# Patient Record
Sex: MALE | Race: WHITE | Employment: OTHER | URBAN - NONMETROPOLITAN AREA
[De-identification: names, ages, dates, MRNs, and addresses within clinical notes are randomized per-mention and may not be internally consistent; named-entity substitution may affect disease eponyms.]

---

## 2017-01-24 RX ORDER — METOPROLOL SUCCINATE 25 MG/1
TABLET, EXTENDED RELEASE ORAL
Qty: 30 TABLET | Refills: 2 | Status: SHIPPED | OUTPATIENT
Start: 2017-01-24 | End: 2017-03-06 | Stop reason: SDUPTHER

## 2017-01-24 RX ORDER — LISINOPRIL 5 MG/1
TABLET ORAL
Qty: 30 TABLET | Refills: 2 | Status: SHIPPED | OUTPATIENT
Start: 2017-01-24 | End: 2017-03-06 | Stop reason: SDUPTHER

## 2017-01-24 RX ORDER — ATORVASTATIN CALCIUM 40 MG/1
TABLET, FILM COATED ORAL
Qty: 30 TABLET | Refills: 2 | Status: SHIPPED | OUTPATIENT
Start: 2017-01-24 | End: 2017-03-06 | Stop reason: SDUPTHER

## 2017-03-06 DIAGNOSIS — I25.10 CORONARY ARTERY DISEASE INVOLVING NATIVE HEART WITHOUT ANGINA PECTORIS, UNSPECIFIED VESSEL OR LESION TYPE: ICD-10-CM

## 2017-03-06 RX ORDER — CLOPIDOGREL BISULFATE 75 MG/1
TABLET ORAL
Qty: 30 TABLET | Refills: 3 | Status: SHIPPED | OUTPATIENT
Start: 2017-03-06 | End: 2017-06-27 | Stop reason: ALTCHOICE

## 2017-03-06 RX ORDER — LISINOPRIL 5 MG/1
TABLET ORAL
Qty: 30 TABLET | Refills: 2 | Status: SHIPPED | OUTPATIENT
Start: 2017-03-06 | End: 2017-08-01 | Stop reason: SDUPTHER

## 2017-03-06 RX ORDER — ATORVASTATIN CALCIUM 40 MG/1
TABLET, FILM COATED ORAL
Qty: 30 TABLET | Refills: 2 | Status: SHIPPED | OUTPATIENT
Start: 2017-03-06 | End: 2017-08-01 | Stop reason: SDUPTHER

## 2017-03-06 RX ORDER — METOPROLOL SUCCINATE 25 MG/1
TABLET, EXTENDED RELEASE ORAL
Qty: 30 TABLET | Refills: 2 | Status: SHIPPED | OUTPATIENT
Start: 2017-03-06 | End: 2017-08-01 | Stop reason: SDUPTHER

## 2017-06-27 ENCOUNTER — OFFICE VISIT (OUTPATIENT)
Dept: CARDIOLOGY | Age: 72
End: 2017-06-27
Payer: MEDICARE

## 2017-06-27 VITALS
HEIGHT: 69 IN | BODY MASS INDEX: 25.77 KG/M2 | DIASTOLIC BLOOD PRESSURE: 66 MMHG | HEART RATE: 60 BPM | SYSTOLIC BLOOD PRESSURE: 124 MMHG | WEIGHT: 174 LBS

## 2017-06-27 DIAGNOSIS — I10 ESSENTIAL HYPERTENSION: ICD-10-CM

## 2017-06-27 DIAGNOSIS — I25.10 CORONARY ARTERY DISEASE INVOLVING NATIVE HEART WITHOUT ANGINA PECTORIS, UNSPECIFIED VESSEL OR LESION TYPE: Primary | ICD-10-CM

## 2017-06-27 DIAGNOSIS — I51.9 LEFT VENTRICULAR DYSFUNCTION: ICD-10-CM

## 2017-06-27 PROCEDURE — 1123F ACP DISCUSS/DSCN MKR DOCD: CPT | Performed by: INTERNAL MEDICINE

## 2017-06-27 PROCEDURE — G8419 CALC BMI OUT NRM PARAM NOF/U: HCPCS | Performed by: INTERNAL MEDICINE

## 2017-06-27 PROCEDURE — 1036F TOBACCO NON-USER: CPT | Performed by: INTERNAL MEDICINE

## 2017-06-27 PROCEDURE — G8427 DOCREV CUR MEDS BY ELIG CLIN: HCPCS | Performed by: INTERNAL MEDICINE

## 2017-06-27 PROCEDURE — 3017F COLORECTAL CA SCREEN DOC REV: CPT | Performed by: INTERNAL MEDICINE

## 2017-06-27 PROCEDURE — G8598 ASA/ANTIPLAT THER USED: HCPCS | Performed by: INTERNAL MEDICINE

## 2017-06-27 PROCEDURE — 4040F PNEUMOC VAC/ADMIN/RCVD: CPT | Performed by: INTERNAL MEDICINE

## 2017-06-27 PROCEDURE — 99212 OFFICE O/P EST SF 10 MIN: CPT | Performed by: INTERNAL MEDICINE

## 2017-06-27 RX ORDER — MINOCYCLINE HYDROCHLORIDE 50 MG/1
50 CAPSULE ORAL 2 TIMES DAILY
COMMUNITY
Start: 2017-06-05 | End: 2019-07-02

## 2017-06-27 RX ORDER — PANTOPRAZOLE SODIUM 40 MG/1
40 TABLET, DELAYED RELEASE ORAL DAILY
COMMUNITY
Start: 2017-06-05 | End: 2018-01-03

## 2017-08-01 RX ORDER — ATORVASTATIN CALCIUM 40 MG/1
TABLET, FILM COATED ORAL
Qty: 30 TABLET | Refills: 0 | Status: SHIPPED | OUTPATIENT
Start: 2017-08-01 | End: 2018-01-03 | Stop reason: SDUPTHER

## 2017-08-01 RX ORDER — METOPROLOL SUCCINATE 25 MG/1
TABLET, EXTENDED RELEASE ORAL
Qty: 30 TABLET | Refills: 5 | Status: SHIPPED | OUTPATIENT
Start: 2017-08-01 | End: 2018-01-03 | Stop reason: SDUPTHER

## 2017-08-01 RX ORDER — LISINOPRIL 5 MG/1
TABLET ORAL
Qty: 30 TABLET | Refills: 5 | Status: SHIPPED | OUTPATIENT
Start: 2017-08-01 | End: 2018-01-03 | Stop reason: DRUGHIGH

## 2017-12-07 ENCOUNTER — TELEPHONE (OUTPATIENT)
Dept: CARDIOLOGY | Age: 72
End: 2017-12-07

## 2017-12-07 NOTE — TELEPHONE ENCOUNTER
Patient is calling stating his kidney doctor is wanting to adjust his Lisinopril but is wanting Dr. Reece Skiff to OK this before doing so.

## 2018-01-03 ENCOUNTER — OFFICE VISIT (OUTPATIENT)
Dept: CARDIOLOGY | Age: 73
End: 2018-01-03
Payer: MEDICARE

## 2018-01-03 VITALS
DIASTOLIC BLOOD PRESSURE: 78 MMHG | HEART RATE: 55 BPM | SYSTOLIC BLOOD PRESSURE: 112 MMHG | BODY MASS INDEX: 28.88 KG/M2 | HEIGHT: 69 IN | WEIGHT: 195 LBS

## 2018-01-03 DIAGNOSIS — I25.10 CORONARY ARTERY DISEASE INVOLVING NATIVE CORONARY ARTERY OF NATIVE HEART WITHOUT ANGINA PECTORIS: Primary | ICD-10-CM

## 2018-01-03 DIAGNOSIS — E78.5 HYPERLIPIDEMIA, UNSPECIFIED HYPERLIPIDEMIA TYPE: ICD-10-CM

## 2018-01-03 PROCEDURE — 3017F COLORECTAL CA SCREEN DOC REV: CPT | Performed by: CLINICAL NURSE SPECIALIST

## 2018-01-03 PROCEDURE — 99213 OFFICE O/P EST LOW 20 MIN: CPT | Performed by: CLINICAL NURSE SPECIALIST

## 2018-01-03 PROCEDURE — 1123F ACP DISCUSS/DSCN MKR DOCD: CPT | Performed by: CLINICAL NURSE SPECIALIST

## 2018-01-03 PROCEDURE — 1036F TOBACCO NON-USER: CPT | Performed by: CLINICAL NURSE SPECIALIST

## 2018-01-03 PROCEDURE — G8598 ASA/ANTIPLAT THER USED: HCPCS | Performed by: CLINICAL NURSE SPECIALIST

## 2018-01-03 PROCEDURE — G8419 CALC BMI OUT NRM PARAM NOF/U: HCPCS | Performed by: CLINICAL NURSE SPECIALIST

## 2018-01-03 PROCEDURE — G8427 DOCREV CUR MEDS BY ELIG CLIN: HCPCS | Performed by: CLINICAL NURSE SPECIALIST

## 2018-01-03 PROCEDURE — 4040F PNEUMOC VAC/ADMIN/RCVD: CPT | Performed by: CLINICAL NURSE SPECIALIST

## 2018-01-03 PROCEDURE — G8484 FLU IMMUNIZE NO ADMIN: HCPCS | Performed by: CLINICAL NURSE SPECIALIST

## 2018-01-03 PROCEDURE — 93000 ELECTROCARDIOGRAM COMPLETE: CPT | Performed by: CLINICAL NURSE SPECIALIST

## 2018-01-03 RX ORDER — ATORVASTATIN CALCIUM 40 MG/1
40 TABLET, FILM COATED ORAL DAILY
Qty: 90 TABLET | Refills: 3 | Status: SHIPPED | OUTPATIENT
Start: 2018-01-03 | End: 2018-04-06 | Stop reason: DRUGHIGH

## 2018-01-03 RX ORDER — ALFUZOSIN HYDROCHLORIDE 10 MG/1
10 TABLET, EXTENDED RELEASE ORAL DAILY
COMMUNITY

## 2018-01-03 RX ORDER — RANITIDINE 150 MG/1
150 TABLET ORAL 2 TIMES DAILY
COMMUNITY
End: 2021-06-29

## 2018-01-03 RX ORDER — METOPROLOL SUCCINATE 25 MG/1
25 TABLET, EXTENDED RELEASE ORAL DAILY
Qty: 90 TABLET | Refills: 3 | Status: SHIPPED | OUTPATIENT
Start: 2018-01-03 | End: 2018-07-12 | Stop reason: SDUPTHER

## 2018-01-03 RX ORDER — LISINOPRIL 2.5 MG/1
2.5 TABLET ORAL DAILY
COMMUNITY
End: 2018-07-12 | Stop reason: SDUPTHER

## 2018-01-03 NOTE — PROGRESS NOTES
[facility], [address] [place] [address], [address] [id number]  Phone: [phone]  Fax: [phone]    OFFICE VISIT:  1/3/2018    Codey Aguilar - : 1945    Reason For Visit:  Dorothy Tan is a 67 y.o. male who is here for 6 Month Follow-Up (no symptoms ) and Coronary Artery Disease   16  Acute inferior ST segment elevation MI, received TNKase at [facility], AUC indication 2, AUC score 9, [facility] 2012;59:16  Cath 90% mid LAD 2.25 x 15 and 2.25 x 18 KAVITA, o/w luminals, apical dyskinesis, EF 45%    Subjective  Dorothy Tan denies exertional chest pain, shortness of breath, orthopnea, paroxysmal nocturnal dyspnea, syncope, presyncope, arrhythmia, edema and fatigue. The patient denies numbness or weakness to suggest cerebrovascular accident or transient ischemic attack. CARLOS MANUEL Mayberry is PCP and follows labs including lipids.   He is following with Dr. Chikis Christine  in [place] for stage 3 CKD  Switched from PPI to H2 blocker and stopped his IBU  Dr Huy Colon is urologist.   Codey Aguilar has the following history as recorded in [facility]:    Patient Active Problem List    Diagnosis Date Noted    Hyperlipidemia     Chest pain     MI (myocardial infarction)     Left ventricular dysfunction     CAD (coronary artery disease) 2016    ED (erectile dysfunction) 2016     Past Medical History:   Diagnosis Date    Brewer esophagus     Dr Mino Plascencia CAD (coronary artery disease) 2016  Acute inferior ST segment elevation MI, received TNKase at [facility], AUC indication 2, AUC score 9, [facility] ;59:16  Cath 90% mid LAD 2.25 x 15 and 2.25 x 18 KAVITA, o/w luminals, apical dyskinesis, EF 45%    Cancer (HCC)     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    ED (erectile dysfunction) 2016    Kidney disease stage 3     Left ventricular dysfunction     MI (myocardial infarction)      Past Surgical History:   Procedure Laterality Date    CARDIAC CATHETERIZATION      COLONOSCOPY      HAND SURGERY      SKIN BIOPSY       Family History   Problem Relation Age of Onset    Heart Disease Mother      Social History   Substance Use Topics    Smoking status: Former Smoker    Smokeless tobacco: Never Used    Alcohol use Not on file      Current Outpatient Prescriptions   Medication Sig Dispense Refill    lisinopril (PRINIVIL;ZESTRIL) 2.5 MG tablet Take 2.5 mg by mouth daily      ranitidine (ZANTAC) 150 MG tablet Take 150 mg by mouth 2 times daily      alfuzosin (UROXATRAL) 10 MG extended release tablet Take 10 mg by mouth daily      atorvastatin (LIPITOR) 40 MG tablet Take 1 tablet by mouth daily 90 tablet 3    metoprolol succinate (TOPROL XL) 25 MG extended release tablet Take 1 tablet by mouth daily 90 tablet 3    minocycline (MINOCIN;DYNACIN) 50 MG capsule Take 50 mg by mouth 2 times daily       aspirin 81 MG tablet Take 81 mg by mouth daily      tadalafil (CIALIS) 5 MG tablet Take 5 mg by mouth as needed for Erectile Dysfunction       No current facility-administered medications for this visit. Allergies: Review of patient's allergies indicates no known allergies. Review of Systems  Constitutional  no significant activity change, appetite change, or unexpected weight change. No fever, chills or diaphoresis. No fatigue. HEENT  no significant rhinorrhea or epistaxis. No tinnitus or significant hearing loss. Eyes  no sudden vision change or amaurosis. Respiratory  no significant wheezing, stridor, apnea or cough. No dyspnea on exertion or shortness of breath. Cardiovascular  no exertional chest pain, orthopnea or PND. No sensation of arrhythmia or slow heart rate. No claudication or leg edema. Gastrointestinal  no abdominal swelling or pain. No blood in stool. No severe constipation, diarrhea, nausea, or vomiting. Genitourinary  no difficulty urinating, dysuria, frequency, or urgency.  No flank pain or 06/27/17 60   12/20/16 60      EKG today shows sinus bradycardia with a rate of 55. First-degree AV block and right BBB. Extensive old anterior inferior infarct. This is unchanged from previous EKG  Blood pressure heart rate well-controlled. On appropriate medical management with beta blocker, ACE inhibitor, aspirin and statin  Reviewed cholesterol from PCP done in November. Total cholesterol 159, triglycerides 114, HDL 52, LDL 84 with normal liver enzymes. Also reviewed labs creatinine holding steady at 1.54. He has follow-up with labs next month for his nephrologist after medication changes     States taking medications as prescribed  Stable cardiovascular status. No evidence of overt heart failure, angina or dysrhythmia. Plan    Follow up in 6 months With Dr. Prince Foss  Call with any questions or concerns  Follow up with CARLOS MANUEL Reinoso for non cardiac problems and labs   Report any new problems  Cardiovascular Fitness-Exercise as tolerated. Strive for 30 minutes of exercise most days of the week. Cardiac / Healthy Diet  Continue current medications as directed  Continue plan of treatment  It is always recommended that you bring your medications bottles with you to each visit - this is for your safety!        Carlen Peabody, APRN

## 2018-02-15 ENCOUNTER — OFFICE VISIT (OUTPATIENT)
Dept: CARDIOLOGY | Age: 73
End: 2018-02-15
Payer: MEDICARE

## 2018-02-15 VITALS
HEIGHT: 70 IN | SYSTOLIC BLOOD PRESSURE: 114 MMHG | BODY MASS INDEX: 28.2 KG/M2 | HEART RATE: 70 BPM | WEIGHT: 197 LBS | DIASTOLIC BLOOD PRESSURE: 74 MMHG

## 2018-02-15 DIAGNOSIS — R07.9 CHEST PAIN, UNSPECIFIED TYPE: ICD-10-CM

## 2018-02-15 DIAGNOSIS — I25.10 CORONARY ARTERY DISEASE INVOLVING NATIVE CORONARY ARTERY OF NATIVE HEART WITHOUT ANGINA PECTORIS: Primary | ICD-10-CM

## 2018-02-15 PROCEDURE — 3017F COLORECTAL CA SCREEN DOC REV: CPT | Performed by: CLINICAL NURSE SPECIALIST

## 2018-02-15 PROCEDURE — 99213 OFFICE O/P EST LOW 20 MIN: CPT | Performed by: CLINICAL NURSE SPECIALIST

## 2018-02-15 PROCEDURE — G8419 CALC BMI OUT NRM PARAM NOF/U: HCPCS | Performed by: CLINICAL NURSE SPECIALIST

## 2018-02-15 PROCEDURE — 1123F ACP DISCUSS/DSCN MKR DOCD: CPT | Performed by: CLINICAL NURSE SPECIALIST

## 2018-02-15 PROCEDURE — 4040F PNEUMOC VAC/ADMIN/RCVD: CPT | Performed by: CLINICAL NURSE SPECIALIST

## 2018-02-15 PROCEDURE — G8427 DOCREV CUR MEDS BY ELIG CLIN: HCPCS | Performed by: CLINICAL NURSE SPECIALIST

## 2018-02-15 PROCEDURE — G8598 ASA/ANTIPLAT THER USED: HCPCS | Performed by: CLINICAL NURSE SPECIALIST

## 2018-02-15 PROCEDURE — 1036F TOBACCO NON-USER: CPT | Performed by: CLINICAL NURSE SPECIALIST

## 2018-02-15 PROCEDURE — G8484 FLU IMMUNIZE NO ADMIN: HCPCS | Performed by: CLINICAL NURSE SPECIALIST

## 2018-02-15 RX ORDER — SILDENAFIL CITRATE 20 MG/1
20 TABLET ORAL PRN
COMMUNITY
End: 2022-05-24 | Stop reason: ALTCHOICE

## 2018-02-22 ENCOUNTER — APPOINTMENT (OUTPATIENT)
Dept: GENERAL RADIOLOGY | Age: 73
End: 2018-02-22
Attending: INTERNAL MEDICINE
Payer: MEDICARE

## 2018-02-22 ENCOUNTER — HOSPITAL ENCOUNTER (OUTPATIENT)
Dept: NON INVASIVE DIAGNOSTICS | Age: 73
Setting detail: OBSERVATION
Discharge: HOME | End: 2018-02-23
Attending: INTERNAL MEDICINE | Admitting: INTERNAL MEDICINE
Payer: MEDICARE

## 2018-02-22 DIAGNOSIS — R07.9 CHEST PAIN, UNSPECIFIED TYPE: ICD-10-CM

## 2018-02-22 DIAGNOSIS — I25.10 CORONARY ARTERY DISEASE INVOLVING NATIVE CORONARY ARTERY OF NATIVE HEART WITHOUT ANGINA PECTORIS: ICD-10-CM

## 2018-02-22 LAB
CHOLESTEROL, TOTAL: 131 MG/DL (ref 160–199)
HDLC SERPL-MCNC: 47 MG/DL (ref 55–121)
LDL CHOLESTEROL CALCULATED: 59 MG/DL
LV EF: 45 %
LVEF MODALITY: NORMAL
TRIGL SERPL-MCNC: 126 MG/DL (ref 0–149)

## 2018-02-22 PROCEDURE — 6370000000 HC RX 637 (ALT 250 FOR IP): Performed by: INTERNAL MEDICINE

## 2018-02-22 PROCEDURE — 2580000003 HC RX 258: Performed by: INTERNAL MEDICINE

## 2018-02-22 PROCEDURE — 80061 LIPID PANEL: CPT

## 2018-02-22 PROCEDURE — 6360000004 HC RX CONTRAST MEDICATION: Performed by: INTERNAL MEDICINE

## 2018-02-22 PROCEDURE — C1760 CLOSURE DEV, VASC: HCPCS

## 2018-02-22 PROCEDURE — 92928 PRQ TCAT PLMT NTRAC ST 1 LES: CPT | Performed by: INTERNAL MEDICINE

## 2018-02-22 PROCEDURE — 6370000000 HC RX 637 (ALT 250 FOR IP)

## 2018-02-22 PROCEDURE — C1887 CATHETER, GUIDING: HCPCS

## 2018-02-22 PROCEDURE — 93458 L HRT ARTERY/VENTRICLE ANGIO: CPT | Performed by: INTERNAL MEDICINE

## 2018-02-22 PROCEDURE — C1874 STENT, COATED/COV W/DEL SYS: HCPCS

## 2018-02-22 PROCEDURE — G0378 HOSPITAL OBSERVATION PER HR: HCPCS

## 2018-02-22 PROCEDURE — 2720000001 HC MISC SURG SUPPLY STERILE $51-500

## 2018-02-22 PROCEDURE — C1769 GUIDE WIRE: HCPCS

## 2018-02-22 PROCEDURE — 2720000000 HC MISC SURG SUPPLY STERILE $0-50

## 2018-02-22 PROCEDURE — 6360000002 HC RX W HCPCS

## 2018-02-22 PROCEDURE — 2709999900 HC NON-CHARGEABLE SUPPLY

## 2018-02-22 PROCEDURE — 36415 COLL VENOUS BLD VENIPUNCTURE: CPT

## 2018-02-22 RX ORDER — SODIUM CHLORIDE 9 MG/ML
INJECTION, SOLUTION INTRAVENOUS CONTINUOUS
Status: DISCONTINUED | OUTPATIENT
Start: 2018-02-22 | End: 2018-02-22

## 2018-02-22 RX ORDER — SILDENAFIL CITRATE 20 MG/1
20 TABLET ORAL 3 TIMES DAILY
Status: DISCONTINUED | OUTPATIENT
Start: 2018-02-22 | End: 2018-02-23 | Stop reason: HOSPADM

## 2018-02-22 RX ORDER — METOPROLOL SUCCINATE 25 MG/1
25 TABLET, EXTENDED RELEASE ORAL DAILY
Status: DISCONTINUED | OUTPATIENT
Start: 2018-02-22 | End: 2018-02-23 | Stop reason: HOSPADM

## 2018-02-22 RX ORDER — SODIUM CHLORIDE 0.9 % (FLUSH) 0.9 %
10 SYRINGE (ML) INJECTION EVERY 12 HOURS SCHEDULED
Status: DISCONTINUED | OUTPATIENT
Start: 2018-02-22 | End: 2018-02-23 | Stop reason: HOSPADM

## 2018-02-22 RX ORDER — FAMOTIDINE 20 MG/1
20 TABLET, FILM COATED ORAL 2 TIMES DAILY
Status: DISCONTINUED | OUTPATIENT
Start: 2018-02-22 | End: 2018-02-23 | Stop reason: HOSPADM

## 2018-02-22 RX ORDER — LISINOPRIL 2.5 MG/1
2.5 TABLET ORAL DAILY
Status: DISCONTINUED | OUTPATIENT
Start: 2018-02-22 | End: 2018-02-23 | Stop reason: HOSPADM

## 2018-02-22 RX ORDER — ONDANSETRON 2 MG/ML
4 INJECTION INTRAMUSCULAR; INTRAVENOUS EVERY 6 HOURS PRN
Status: DISCONTINUED | OUTPATIENT
Start: 2018-02-22 | End: 2018-02-23 | Stop reason: HOSPADM

## 2018-02-22 RX ORDER — ASPIRIN 81 MG/1
81 TABLET, CHEWABLE ORAL DAILY
Status: DISCONTINUED | OUTPATIENT
Start: 2018-02-22 | End: 2018-02-23 | Stop reason: HOSPADM

## 2018-02-22 RX ORDER — SODIUM CHLORIDE 0.9 % (FLUSH) 0.9 %
10 SYRINGE (ML) INJECTION EVERY 12 HOURS SCHEDULED
Status: DISCONTINUED | OUTPATIENT
Start: 2018-02-22 | End: 2018-02-22

## 2018-02-22 RX ORDER — SODIUM CHLORIDE 9 MG/ML
INJECTION, SOLUTION INTRAVENOUS CONTINUOUS
Status: ACTIVE | OUTPATIENT
Start: 2018-02-22 | End: 2018-02-22

## 2018-02-22 RX ORDER — SODIUM CHLORIDE 0.9 % (FLUSH) 0.9 %
10 SYRINGE (ML) INJECTION PRN
Status: DISCONTINUED | OUTPATIENT
Start: 2018-02-22 | End: 2018-02-22

## 2018-02-22 RX ORDER — RANITIDINE 150 MG/1
150 TABLET ORAL 2 TIMES DAILY
Status: DISCONTINUED | OUTPATIENT
Start: 2018-02-22 | End: 2018-02-22 | Stop reason: CLARIF

## 2018-02-22 RX ORDER — MINOCYCLINE HYDROCHLORIDE 50 MG/1
50 CAPSULE ORAL 2 TIMES DAILY
Status: DISCONTINUED | OUTPATIENT
Start: 2018-02-22 | End: 2018-02-23 | Stop reason: HOSPADM

## 2018-02-22 RX ORDER — PRASUGREL 10 MG/1
10 TABLET, FILM COATED ORAL DAILY
Status: DISCONTINUED | OUTPATIENT
Start: 2018-02-23 | End: 2018-02-23 | Stop reason: HOSPADM

## 2018-02-22 RX ORDER — SODIUM CHLORIDE 0.9 % (FLUSH) 0.9 %
10 SYRINGE (ML) INJECTION PRN
Status: DISCONTINUED | OUTPATIENT
Start: 2018-02-22 | End: 2018-02-23 | Stop reason: HOSPADM

## 2018-02-22 RX ORDER — ALFUZOSIN HYDROCHLORIDE 10 MG/1
10 TABLET, EXTENDED RELEASE ORAL DAILY
Status: DISCONTINUED | OUTPATIENT
Start: 2018-02-22 | End: 2018-02-22 | Stop reason: CLARIF

## 2018-02-22 RX ORDER — ATORVASTATIN CALCIUM 40 MG/1
40 TABLET, FILM COATED ORAL NIGHTLY
Status: DISCONTINUED | OUTPATIENT
Start: 2018-02-22 | End: 2018-02-23 | Stop reason: HOSPADM

## 2018-02-22 RX ORDER — TAMSULOSIN HYDROCHLORIDE 0.4 MG/1
0.4 CAPSULE ORAL DAILY
Status: DISCONTINUED | OUTPATIENT
Start: 2018-02-22 | End: 2018-02-23 | Stop reason: HOSPADM

## 2018-02-22 RX ORDER — ACETAMINOPHEN 325 MG/1
650 TABLET ORAL EVERY 4 HOURS PRN
Status: DISCONTINUED | OUTPATIENT
Start: 2018-02-22 | End: 2018-02-23 | Stop reason: HOSPADM

## 2018-02-22 RX ADMIN — FAMOTIDINE 20 MG: 20 TABLET, FILM COATED ORAL at 21:21

## 2018-02-22 RX ADMIN — SILDENAFIL 20 MG: 20 TABLET ORAL at 17:04

## 2018-02-22 RX ADMIN — TAMSULOSIN HYDROCHLORIDE 0.4 MG: 0.4 CAPSULE ORAL at 17:04

## 2018-02-22 RX ADMIN — METOPROLOL SUCCINATE 25 MG: 25 TABLET, EXTENDED RELEASE ORAL at 17:04

## 2018-02-22 RX ADMIN — SODIUM CHLORIDE: 9 INJECTION, SOLUTION INTRAVENOUS at 16:19

## 2018-02-22 RX ADMIN — PERFLUTREN 2.2 MG: 6.52 INJECTION, SUSPENSION INTRAVENOUS at 10:45

## 2018-02-22 RX ADMIN — Medication 10 ML: at 21:21

## 2018-02-22 RX ADMIN — MINOCYCLINE HYDROCHLORIDE 50 MG: 50 CAPSULE ORAL at 21:21

## 2018-02-22 RX ADMIN — ATORVASTATIN CALCIUM 40 MG: 40 TABLET, FILM COATED ORAL at 21:21

## 2018-02-22 RX ADMIN — SODIUM CHLORIDE: 9 INJECTION, SOLUTION INTRAVENOUS at 13:46

## 2018-02-22 RX ADMIN — ASPIRIN 81 MG CHEWABLE TABLET 81 MG: 81 TABLET CHEWABLE at 17:04

## 2018-02-22 RX ADMIN — LISINOPRIL 2.5 MG: 2.5 TABLET ORAL at 17:04

## 2018-02-22 NOTE — H&P
CARLOS MANUEL Tesfaye Nurse Practitioner Signed   Progress Notes Date of Service: 2/15/2018  2:25 PM   Related encounter: Office Visit from 2/15/2018 in [facility] Collapse All    []Hide copied text  [facility], [address] [facility], [address]  Phone: [phone]  Fax: [phone]     OFFICE VISIT:  2/15/2018     Harry Johnson - : 1945     Reason For Visit:  Ivet Prince is a 67 y.o. male who is here for Follow-up and Congestive Heart Failure  16  Acute inferior ST segment elevation MI, received TNKase at [facility], AUC indication 2, AUC score 9, [facility] 2012;[id number]  Cath 90% mid LAD 2.25 x 15 and 2.25 x 18 KAVITA, o/w luminals, apical dyskinesis, EF 45%     Patient was seen in the emergency room at [facility] over the weekend for chest pain. No acute findings were found. He states that he left despite been wanting to keep him overnight for observation. He saw primary care practitioner yesterday. States they are going to do some take the attic enzymes. Referred to our office for follow-up     Subjective  Ivet Prince has some chest tightness over the weekend. It occurred when he got up on Friday morning . It persisted for about 4 hours. He went to the ER after any hour or so. It radiated to side and back. No other symptoms with it. BP was normal 122/81. No nitro taken. No alleviating or worsening factors. Resolved on its own. He has not had any other exertional chest pain. He usually works physically and has not problems. No shortness of breath, orthopnea, paroxysmal nocturnal dyspnea, syncope, presyncope, arrhythmia, edema and fatigue. The patient denies numbness or weakness to suggest cerebrovascular accident or transient ischemic attack. CARLOS MANUEL Finch is PCP and follows labs including lipids.   Harry Johnson has the following history as recorded in [facility]:          Patient cardiac problems  Report any new problems  Cardiovascular Fitness-Exercise as tolerated. Strive for 30 minutes of exercise most days of the week. Cardiac / Healthy Diet  Continue current medications as directed  Continue plan of treatment  It is always recommended that you bring your medications bottles with you to each visit - this is for your safety!         Mamadou Ballard, CARLOS MANUEL         Routing History                      2/22/2018       Proposed Procedure:  Selective left heart and coronary arteriography, (femoral approach), 02/22/18      :  J. Thomas Osgood, MD    Indications:  2/22/18  SE Positive for apical and anterior septal myocardial ischemia, intermediate risk findings, AUC indication 16, AUC score 4    I have discussed the risks, benefits and options with the patient and his family. They appear to understand, have no questions, and wish to proceed. This procedure is scheduled for today .       Electronically signed by Johnny Alas MD on 2/22/18

## 2018-02-23 VITALS
HEART RATE: 59 BPM | DIASTOLIC BLOOD PRESSURE: 59 MMHG | HEIGHT: 70 IN | RESPIRATION RATE: 18 BRPM | WEIGHT: 186 LBS | TEMPERATURE: 98.6 F | OXYGEN SATURATION: 98 % | SYSTOLIC BLOOD PRESSURE: 118 MMHG | BODY MASS INDEX: 26.63 KG/M2

## 2018-02-23 LAB
ANION GAP SERPL CALCULATED.3IONS-SCNC: 11 MMOL/L (ref 7–19)
BUN BLDV-MCNC: 19 MG/DL (ref 8–23)
CALCIUM SERPL-MCNC: 9.1 MG/DL (ref 8.8–10.2)
CHLORIDE BLD-SCNC: 104 MMOL/L (ref 98–111)
CO2: 24 MMOL/L (ref 22–29)
CREAT SERPL-MCNC: 1.1 MG/DL (ref 0.5–1.2)
GFR NON-AFRICAN AMERICAN: >60
GLUCOSE BLD-MCNC: 115 MG/DL (ref 74–109)
POTASSIUM SERPL-SCNC: 4.6 MMOL/L (ref 3.5–5)
SODIUM BLD-SCNC: 139 MMOL/L (ref 136–145)

## 2018-02-23 PROCEDURE — 99217 PR OBSERVATION CARE DISCHARGE MANAGEMENT: CPT | Performed by: INTERNAL MEDICINE

## 2018-02-23 PROCEDURE — 80048 BASIC METABOLIC PNL TOTAL CA: CPT

## 2018-02-23 PROCEDURE — 6370000000 HC RX 637 (ALT 250 FOR IP): Performed by: INTERNAL MEDICINE

## 2018-02-23 PROCEDURE — G0378 HOSPITAL OBSERVATION PER HR: HCPCS

## 2018-02-23 PROCEDURE — 93005 ELECTROCARDIOGRAM TRACING: CPT

## 2018-02-23 PROCEDURE — 36415 COLL VENOUS BLD VENIPUNCTURE: CPT

## 2018-02-23 RX ORDER — CLOPIDOGREL BISULFATE 75 MG/1
75 TABLET ORAL DAILY
Qty: 30 TABLET | Refills: 3 | Status: SHIPPED | OUTPATIENT
Start: 2018-02-23 | End: 2018-06-16 | Stop reason: SDUPTHER

## 2018-02-23 RX ADMIN — PRASUGREL 10 MG: 10 TABLET, FILM COATED ORAL at 08:36

## 2018-02-23 RX ADMIN — TAMSULOSIN HYDROCHLORIDE 0.4 MG: 0.4 CAPSULE ORAL at 08:37

## 2018-02-23 RX ADMIN — FAMOTIDINE 20 MG: 20 TABLET, FILM COATED ORAL at 08:36

## 2018-02-23 RX ADMIN — METOPROLOL SUCCINATE 25 MG: 25 TABLET, EXTENDED RELEASE ORAL at 08:37

## 2018-02-23 RX ADMIN — MINOCYCLINE HYDROCHLORIDE 50 MG: 50 CAPSULE ORAL at 08:36

## 2018-02-23 RX ADMIN — ASPIRIN 81 MG CHEWABLE TABLET 81 MG: 81 TABLET CHEWABLE at 08:36

## 2018-02-23 RX ADMIN — LISINOPRIL 2.5 MG: 2.5 TABLET ORAL at 08:36

## 2018-02-23 RX ADMIN — SILDENAFIL 20 MG: 20 TABLET ORAL at 08:36

## 2018-02-23 NOTE — CARE COORDINATION
Requested to determine costs for Effient and [address] reports med not covered and requires a PA. SW informed RN Ledy Calvillo.

## 2018-02-25 LAB
EKG P AXIS: 40 DEGREES
EKG P-R INTERVAL: 230 MS
EKG Q-T INTERVAL: 446 MS
EKG QRS DURATION: 128 MS
EKG QTC CALCULATION (BAZETT): 445 MS
EKG T AXIS: 53 DEGREES

## 2018-02-26 LAB
LV EF: 45 %
LVEF MODALITY: NORMAL

## 2018-03-06 NOTE — DISCHARGE SUMMARY
with KAVITA to RCA      Plan:      2. May go  3. Follow up in the office as arranged  4.  Follow up with PCP      Electronically signed by Kelley Ojeda MD on 3/7/18

## 2018-04-06 ENCOUNTER — OFFICE VISIT (OUTPATIENT)
Dept: CARDIOLOGY | Age: 73
End: 2018-04-06
Payer: MEDICARE

## 2018-04-06 VITALS
SYSTOLIC BLOOD PRESSURE: 110 MMHG | WEIGHT: 196 LBS | HEIGHT: 70 IN | HEART RATE: 58 BPM | DIASTOLIC BLOOD PRESSURE: 60 MMHG | BODY MASS INDEX: 28.06 KG/M2

## 2018-04-06 DIAGNOSIS — I25.10 CORONARY ARTERY DISEASE INVOLVING NATIVE CORONARY ARTERY OF NATIVE HEART WITHOUT ANGINA PECTORIS: Primary | ICD-10-CM

## 2018-04-06 DIAGNOSIS — I51.9 LEFT VENTRICULAR DYSFUNCTION: ICD-10-CM

## 2018-04-06 PROCEDURE — G8427 DOCREV CUR MEDS BY ELIG CLIN: HCPCS | Performed by: CLINICAL NURSE SPECIALIST

## 2018-04-06 PROCEDURE — 3017F COLORECTAL CA SCREEN DOC REV: CPT | Performed by: CLINICAL NURSE SPECIALIST

## 2018-04-06 PROCEDURE — 4040F PNEUMOC VAC/ADMIN/RCVD: CPT | Performed by: CLINICAL NURSE SPECIALIST

## 2018-04-06 PROCEDURE — 1036F TOBACCO NON-USER: CPT | Performed by: CLINICAL NURSE SPECIALIST

## 2018-04-06 PROCEDURE — 1123F ACP DISCUSS/DSCN MKR DOCD: CPT | Performed by: CLINICAL NURSE SPECIALIST

## 2018-04-06 PROCEDURE — G8598 ASA/ANTIPLAT THER USED: HCPCS | Performed by: CLINICAL NURSE SPECIALIST

## 2018-04-06 PROCEDURE — G8419 CALC BMI OUT NRM PARAM NOF/U: HCPCS | Performed by: CLINICAL NURSE SPECIALIST

## 2018-04-06 PROCEDURE — 99213 OFFICE O/P EST LOW 20 MIN: CPT | Performed by: CLINICAL NURSE SPECIALIST

## 2018-04-06 RX ORDER — ACETAMINOPHEN,DIPHENHYDRAMINE HCL 500; 25 MG/1; MG/1
1 TABLET, FILM COATED ORAL NIGHTLY
Status: ON HOLD | COMMUNITY
End: 2022-08-24 | Stop reason: HOSPADM

## 2018-04-06 RX ORDER — FOLIC ACID 0.8 MG
500 TABLET ORAL DAILY
COMMUNITY
End: 2022-05-24

## 2018-04-06 RX ORDER — LORATADINE 10 MG/1
10 CAPSULE, LIQUID FILLED ORAL PRN
Status: ON HOLD | COMMUNITY
End: 2022-08-24 | Stop reason: HOSPADM

## 2018-04-06 RX ORDER — ATORVASTATIN CALCIUM 40 MG/1
40 TABLET, FILM COATED ORAL DAILY
COMMUNITY
End: 2019-10-08 | Stop reason: SDUPTHER

## 2018-04-06 RX ORDER — M-VIT,TX,IRON,MINS/CALC/FOLIC 27MG-0.4MG
1 TABLET ORAL DAILY
COMMUNITY

## 2018-06-11 ENCOUNTER — TELEPHONE (OUTPATIENT)
Dept: CARDIOLOGY | Age: 73
End: 2018-06-11

## 2018-06-18 RX ORDER — CLOPIDOGREL BISULFATE 75 MG/1
TABLET ORAL
Qty: 30 TABLET | Refills: 5 | Status: SHIPPED | OUTPATIENT
Start: 2018-06-18 | End: 2018-07-12 | Stop reason: SDUPTHER

## 2018-07-12 ENCOUNTER — OFFICE VISIT (OUTPATIENT)
Dept: CARDIOLOGY | Age: 73
End: 2018-07-12
Payer: MEDICARE

## 2018-07-12 VITALS
HEIGHT: 69 IN | DIASTOLIC BLOOD PRESSURE: 60 MMHG | WEIGHT: 182 LBS | BODY MASS INDEX: 26.96 KG/M2 | SYSTOLIC BLOOD PRESSURE: 110 MMHG

## 2018-07-12 DIAGNOSIS — I25.10 CORONARY ARTERY DISEASE INVOLVING NATIVE CORONARY ARTERY OF NATIVE HEART WITHOUT ANGINA PECTORIS: ICD-10-CM

## 2018-07-12 DIAGNOSIS — E78.00 PURE HYPERCHOLESTEROLEMIA: Primary | ICD-10-CM

## 2018-07-12 PROCEDURE — 1101F PT FALLS ASSESS-DOCD LE1/YR: CPT | Performed by: CLINICAL NURSE SPECIALIST

## 2018-07-12 PROCEDURE — 3017F COLORECTAL CA SCREEN DOC REV: CPT | Performed by: CLINICAL NURSE SPECIALIST

## 2018-07-12 PROCEDURE — 1036F TOBACCO NON-USER: CPT | Performed by: CLINICAL NURSE SPECIALIST

## 2018-07-12 PROCEDURE — 1123F ACP DISCUSS/DSCN MKR DOCD: CPT | Performed by: CLINICAL NURSE SPECIALIST

## 2018-07-12 PROCEDURE — G8598 ASA/ANTIPLAT THER USED: HCPCS | Performed by: CLINICAL NURSE SPECIALIST

## 2018-07-12 PROCEDURE — G8427 DOCREV CUR MEDS BY ELIG CLIN: HCPCS | Performed by: CLINICAL NURSE SPECIALIST

## 2018-07-12 PROCEDURE — G8419 CALC BMI OUT NRM PARAM NOF/U: HCPCS | Performed by: CLINICAL NURSE SPECIALIST

## 2018-07-12 PROCEDURE — 4040F PNEUMOC VAC/ADMIN/RCVD: CPT | Performed by: CLINICAL NURSE SPECIALIST

## 2018-07-12 PROCEDURE — 99213 OFFICE O/P EST LOW 20 MIN: CPT | Performed by: CLINICAL NURSE SPECIALIST

## 2018-07-12 RX ORDER — METOPROLOL SUCCINATE 25 MG/1
25 TABLET, EXTENDED RELEASE ORAL DAILY
Qty: 90 TABLET | Refills: 3 | Status: SHIPPED | OUTPATIENT
Start: 2018-07-12 | End: 2019-06-17 | Stop reason: SDUPTHER

## 2018-07-12 RX ORDER — CLOPIDOGREL BISULFATE 75 MG/1
TABLET ORAL
Qty: 90 TABLET | Refills: 3 | Status: SHIPPED | OUTPATIENT
Start: 2018-07-12 | End: 2019-07-02

## 2018-07-12 RX ORDER — LISINOPRIL 2.5 MG/1
2.5 TABLET ORAL DAILY
Qty: 90 TABLET | Refills: 3 | Status: SHIPPED | OUTPATIENT
Start: 2018-07-12 | End: 2019-06-17 | Stop reason: SDUPTHER

## 2018-07-12 NOTE — PROGRESS NOTES
[facility], [address] [address] [address] [facility], [address] [id number]  Phone: [phone]  Fax: [phone]    OFFICE VISIT:  7/12/2018    Aylin Mello 116: 1945    Reason For Visit:  Mohit Holt is a 67 y.o. male who is here for 1 Year Follow Up (patient has had dizzy spells) and Coronary Artery Disease  Positive stress test in February. Patient underwent heart catheterization 2/22/18  1.  Successful femoral artery ultrasound  2.  Successful femoral artery arterogram  3.  Single vessel coronary artery disease involving the mid RCA  4.  Left ventricular function is impaired in the anterior lateral   and apical segments, with an estimated visual ejection fraction   of 45%   5.  80% lesion in the mid RCA  6.  Successful percutaneous coronary intervention utilizing a   single drug eluding stent to the mid RCA. 7.  Patent stents in the mid LAD    Subjective  Mohit Holt has been doing well. He is back to work and working outside this week. He did notice a few dizzy spells when bending over. He's trying to stay hydrated working in the heat  He denies exertional chest pain, shortness of breath, orthopnea, paroxysmal nocturnal dyspnea, syncope, presyncope, arrhythmia, edema and fatigue. The patient denies numbness or weakness to suggest cerebrovascular accident or transient ischemic attack. CARLOS MANUEL Hills CNP is PCP   Follows with nephrologist in [address] [facility] Had labs yesterday an appointment next week.   Ciscozak Gregory has the following history as recorded in [facility]:    Patient Active Problem List    Diagnosis Date Noted    CAD in native artery 02/22/2018    Hyperlipidemia     MI (myocardial infarction)     Left ventricular dysfunction     CAD (coronary artery disease) 02/29/2016    ED (erectile dysfunction) 02/29/2016     Past Medical History:   Diagnosis Date    Brewer esophagus     Dr France Sesay CAD (coronary artery disease) 2/29/2016 2/29/16  Acute inferior ST the week. Cardiac / Healthy Diet  Continue current medications as directed  Continue plan of treatment  It is always recommended that you bring your medications bottles with you to each visit - this is for your safety!        CARLOS MANUEL Mcnair

## 2018-07-12 NOTE — PATIENT INSTRUCTIONS
Do not stop or hold her aspirin or Plavix one year after stent  Follow up in 6 mos With Dr. Melissa Chavez   Follow up with Nephrology as planned  Call with any questions or concerns  Follow up with CARLOS MANUEL Meyers CNP for non cardiac problems  Report any new problems  Cardiovascular Fitness-Exercise as tolerated. Strive for 30 minutes of exercise most days of the week. Cardiac / Healthy Diet  Continue current medications as directed  Continue plan of treatment  It is always recommended that you bring your medications bottles with you to each visit - this is for your safety!

## 2019-01-23 ENCOUNTER — OFFICE VISIT (OUTPATIENT)
Dept: CARDIOLOGY | Age: 74
End: 2019-01-23
Payer: MEDICARE

## 2019-01-23 VITALS
SYSTOLIC BLOOD PRESSURE: 124 MMHG | HEART RATE: 54 BPM | DIASTOLIC BLOOD PRESSURE: 78 MMHG | HEIGHT: 69 IN | WEIGHT: 189 LBS | BODY MASS INDEX: 27.99 KG/M2

## 2019-01-23 DIAGNOSIS — I25.10 CORONARY ARTERY DISEASE INVOLVING NATIVE CORONARY ARTERY OF NATIVE HEART WITHOUT ANGINA PECTORIS: Primary | ICD-10-CM

## 2019-01-23 DIAGNOSIS — I10 ESSENTIAL HYPERTENSION: ICD-10-CM

## 2019-01-23 DIAGNOSIS — I51.9 LEFT VENTRICULAR DYSFUNCTION: ICD-10-CM

## 2019-01-23 DIAGNOSIS — E78.00 PURE HYPERCHOLESTEROLEMIA: ICD-10-CM

## 2019-01-23 DIAGNOSIS — Z95.5 HISTORY OF RIGHT CORONARY ARTERY STENT PLACEMENT: ICD-10-CM

## 2019-01-23 DIAGNOSIS — Z95.5 HISTORY OF PLACEMENT OF STENT IN LAD CORONARY ARTERY: ICD-10-CM

## 2019-01-23 PROCEDURE — 1101F PT FALLS ASSESS-DOCD LE1/YR: CPT | Performed by: NURSE PRACTITIONER

## 2019-01-23 PROCEDURE — G8427 DOCREV CUR MEDS BY ELIG CLIN: HCPCS | Performed by: NURSE PRACTITIONER

## 2019-01-23 PROCEDURE — 99213 OFFICE O/P EST LOW 20 MIN: CPT | Performed by: NURSE PRACTITIONER

## 2019-01-23 PROCEDURE — 4040F PNEUMOC VAC/ADMIN/RCVD: CPT | Performed by: NURSE PRACTITIONER

## 2019-01-23 PROCEDURE — 93000 ELECTROCARDIOGRAM COMPLETE: CPT | Performed by: NURSE PRACTITIONER

## 2019-01-23 PROCEDURE — G8419 CALC BMI OUT NRM PARAM NOF/U: HCPCS | Performed by: NURSE PRACTITIONER

## 2019-01-23 PROCEDURE — 1123F ACP DISCUSS/DSCN MKR DOCD: CPT | Performed by: NURSE PRACTITIONER

## 2019-01-23 PROCEDURE — G8598 ASA/ANTIPLAT THER USED: HCPCS | Performed by: NURSE PRACTITIONER

## 2019-01-23 PROCEDURE — 1036F TOBACCO NON-USER: CPT | Performed by: NURSE PRACTITIONER

## 2019-01-23 PROCEDURE — 3017F COLORECTAL CA SCREEN DOC REV: CPT | Performed by: NURSE PRACTITIONER

## 2019-01-23 PROCEDURE — G8484 FLU IMMUNIZE NO ADMIN: HCPCS | Performed by: NURSE PRACTITIONER

## 2019-01-23 RX ORDER — NITROGLYCERIN 0.4 MG/1
0.4 TABLET SUBLINGUAL EVERY 5 MIN PRN
Qty: 25 TABLET | Refills: 3 | Status: SHIPPED | OUTPATIENT
Start: 2019-01-23

## 2019-05-01 ENCOUNTER — TELEPHONE (OUTPATIENT)
Dept: CARDIOLOGY | Age: 74
End: 2019-05-01

## 2019-05-01 NOTE — TELEPHONE ENCOUNTER
Date of procedure: 5/29/19    Cardiologist: Nisha Moncada    Procedure: EGD with dilation    Surgeon: Juan Burgos    Last Office Visit: 1/23/19 (marina)  Reason for office visit and medical concerns addressed at this office visit: CAD, Hyperlipidemia, Left Ventricular dysfunction, HTN, MI    Testing Performed and Date of Service:  1/23/19 EKG  Does the patient have a stent? If so, what type? KAVITA x2 2/22/18    Current Medications: ASA, Plavix was stopped 2/22/19, Lisinopril, Toprol xl, Lipitor, Claritin, Magnesium, Revatio, Zantac, Uroxatral, Minocycline    Is the patient currently taking an anticoagulant? ASA 81mg    Additional Notes: Asking to hold ASA for 7 days prior to procedure.

## 2019-06-18 RX ORDER — LISINOPRIL 2.5 MG/1
2.5 TABLET ORAL DAILY
Qty: 90 TABLET | Refills: 3 | Status: SHIPPED | OUTPATIENT
Start: 2019-06-18 | End: 2019-10-08 | Stop reason: SDUPTHER

## 2019-06-18 RX ORDER — METOPROLOL SUCCINATE 25 MG/1
25 TABLET, EXTENDED RELEASE ORAL DAILY
Qty: 90 TABLET | Refills: 3 | Status: SHIPPED | OUTPATIENT
Start: 2019-06-18 | End: 2019-10-08 | Stop reason: SDUPTHER

## 2019-07-02 ENCOUNTER — OFFICE VISIT (OUTPATIENT)
Dept: CARDIOLOGY | Age: 74
End: 2019-07-02
Payer: MEDICARE

## 2019-07-02 VITALS
SYSTOLIC BLOOD PRESSURE: 102 MMHG | HEIGHT: 70 IN | WEIGHT: 183 LBS | DIASTOLIC BLOOD PRESSURE: 60 MMHG | HEART RATE: 60 BPM | BODY MASS INDEX: 26.2 KG/M2

## 2019-07-02 DIAGNOSIS — E78.2 MIXED HYPERLIPIDEMIA: ICD-10-CM

## 2019-07-02 DIAGNOSIS — I25.10 CORONARY ARTERY DISEASE INVOLVING NATIVE CORONARY ARTERY OF NATIVE HEART WITHOUT ANGINA PECTORIS: ICD-10-CM

## 2019-07-02 DIAGNOSIS — I10 ESSENTIAL HYPERTENSION: ICD-10-CM

## 2019-07-02 DIAGNOSIS — R00.1 BRADYCARDIA: Primary | ICD-10-CM

## 2019-07-02 DIAGNOSIS — Z95.5 HISTORY OF PLACEMENT OF STENT IN LAD CORONARY ARTERY: ICD-10-CM

## 2019-07-02 PROCEDURE — G8419 CALC BMI OUT NRM PARAM NOF/U: HCPCS | Performed by: NURSE PRACTITIONER

## 2019-07-02 PROCEDURE — 1123F ACP DISCUSS/DSCN MKR DOCD: CPT | Performed by: NURSE PRACTITIONER

## 2019-07-02 PROCEDURE — 93000 ELECTROCARDIOGRAM COMPLETE: CPT | Performed by: NURSE PRACTITIONER

## 2019-07-02 PROCEDURE — G8598 ASA/ANTIPLAT THER USED: HCPCS | Performed by: NURSE PRACTITIONER

## 2019-07-02 PROCEDURE — 3017F COLORECTAL CA SCREEN DOC REV: CPT | Performed by: NURSE PRACTITIONER

## 2019-07-02 PROCEDURE — 99214 OFFICE O/P EST MOD 30 MIN: CPT | Performed by: NURSE PRACTITIONER

## 2019-07-02 PROCEDURE — 0296T PR EXT ECG > 48HR TO 21 DAY RCRD W/CONECT INTL RCRD: CPT | Performed by: NURSE PRACTITIONER

## 2019-07-02 PROCEDURE — 1036F TOBACCO NON-USER: CPT | Performed by: NURSE PRACTITIONER

## 2019-07-02 PROCEDURE — G8427 DOCREV CUR MEDS BY ELIG CLIN: HCPCS | Performed by: NURSE PRACTITIONER

## 2019-07-02 PROCEDURE — 4040F PNEUMOC VAC/ADMIN/RCVD: CPT | Performed by: NURSE PRACTITIONER

## 2019-07-02 RX ORDER — HYDROCHLOROTHIAZIDE 12.5 MG/1
12.5 TABLET ORAL DAILY
COMMUNITY
End: 2019-10-08 | Stop reason: SDUPTHER

## 2019-10-08 ENCOUNTER — OFFICE VISIT (OUTPATIENT)
Dept: CARDIOLOGY | Age: 74
End: 2019-10-08
Payer: MEDICARE

## 2019-10-08 VITALS
OXYGEN SATURATION: 98 % | BODY MASS INDEX: 29.25 KG/M2 | HEIGHT: 66 IN | HEART RATE: 60 BPM | DIASTOLIC BLOOD PRESSURE: 88 MMHG | SYSTOLIC BLOOD PRESSURE: 118 MMHG | WEIGHT: 182 LBS

## 2019-10-08 DIAGNOSIS — I10 ESSENTIAL HYPERTENSION: ICD-10-CM

## 2019-10-08 DIAGNOSIS — I25.10 CORONARY ARTERY DISEASE INVOLVING NATIVE CORONARY ARTERY OF NATIVE HEART WITHOUT ANGINA PECTORIS: Primary | ICD-10-CM

## 2019-10-08 DIAGNOSIS — E78.2 MIXED HYPERLIPIDEMIA: ICD-10-CM

## 2019-10-08 DIAGNOSIS — Z95.5 HISTORY OF PLACEMENT OF STENT IN LAD CORONARY ARTERY: ICD-10-CM

## 2019-10-08 PROCEDURE — G8419 CALC BMI OUT NRM PARAM NOF/U: HCPCS | Performed by: NURSE PRACTITIONER

## 2019-10-08 PROCEDURE — G8427 DOCREV CUR MEDS BY ELIG CLIN: HCPCS | Performed by: NURSE PRACTITIONER

## 2019-10-08 PROCEDURE — G8484 FLU IMMUNIZE NO ADMIN: HCPCS | Performed by: NURSE PRACTITIONER

## 2019-10-08 PROCEDURE — 1123F ACP DISCUSS/DSCN MKR DOCD: CPT | Performed by: NURSE PRACTITIONER

## 2019-10-08 PROCEDURE — 1036F TOBACCO NON-USER: CPT | Performed by: NURSE PRACTITIONER

## 2019-10-08 PROCEDURE — G8598 ASA/ANTIPLAT THER USED: HCPCS | Performed by: NURSE PRACTITIONER

## 2019-10-08 PROCEDURE — 99213 OFFICE O/P EST LOW 20 MIN: CPT | Performed by: NURSE PRACTITIONER

## 2019-10-08 PROCEDURE — 4040F PNEUMOC VAC/ADMIN/RCVD: CPT | Performed by: NURSE PRACTITIONER

## 2019-10-08 PROCEDURE — 3017F COLORECTAL CA SCREEN DOC REV: CPT | Performed by: NURSE PRACTITIONER

## 2019-10-08 RX ORDER — LISINOPRIL 2.5 MG/1
2.5 TABLET ORAL DAILY
Qty: 90 TABLET | Refills: 3 | Status: SHIPPED | OUTPATIENT
Start: 2019-10-08 | End: 2020-06-17 | Stop reason: SDUPTHER

## 2019-10-08 RX ORDER — HYDROCHLOROTHIAZIDE 12.5 MG/1
12.5 TABLET ORAL DAILY
Qty: 90 TABLET | Refills: 3 | Status: SHIPPED | OUTPATIENT
Start: 2019-10-08 | End: 2020-06-17 | Stop reason: SDUPTHER

## 2019-10-08 RX ORDER — ATORVASTATIN CALCIUM 40 MG/1
40 TABLET, FILM COATED ORAL DAILY
Qty: 90 TABLET | Refills: 3 | Status: SHIPPED | OUTPATIENT
Start: 2019-10-08 | End: 2020-06-17 | Stop reason: SDUPTHER

## 2019-10-08 RX ORDER — METOPROLOL SUCCINATE 25 MG/1
25 TABLET, EXTENDED RELEASE ORAL DAILY
Qty: 90 TABLET | Refills: 3 | Status: SHIPPED | OUTPATIENT
Start: 2019-10-08 | End: 2020-06-17 | Stop reason: SDUPTHER

## 2020-05-08 ENCOUNTER — TELEPHONE (OUTPATIENT)
Dept: UROLOGY | Facility: CLINIC | Age: 75
End: 2020-05-08

## 2020-05-08 NOTE — TELEPHONE ENCOUNTER
I called [facility] to get medical records for patient. His appointment is Monday with Dr. Persaud. He has seen him previously at [facility]. They were closed so we will try Monday morning for records.

## 2020-06-02 ENCOUNTER — OFFICE VISIT (OUTPATIENT)
Dept: CARDIOLOGY | Age: 75
End: 2020-06-02
Payer: MEDICARE

## 2020-06-02 VITALS
DIASTOLIC BLOOD PRESSURE: 62 MMHG | HEIGHT: 70 IN | WEIGHT: 177 LBS | HEART RATE: 60 BPM | SYSTOLIC BLOOD PRESSURE: 104 MMHG | BODY MASS INDEX: 25.34 KG/M2

## 2020-06-02 PROCEDURE — 4040F PNEUMOC VAC/ADMIN/RCVD: CPT | Performed by: INTERNAL MEDICINE

## 2020-06-02 PROCEDURE — 3017F COLORECTAL CA SCREEN DOC REV: CPT | Performed by: INTERNAL MEDICINE

## 2020-06-02 PROCEDURE — 99212 OFFICE O/P EST SF 10 MIN: CPT | Performed by: INTERNAL MEDICINE

## 2020-06-02 PROCEDURE — G8427 DOCREV CUR MEDS BY ELIG CLIN: HCPCS | Performed by: INTERNAL MEDICINE

## 2020-06-02 PROCEDURE — G8417 CALC BMI ABV UP PARAM F/U: HCPCS | Performed by: INTERNAL MEDICINE

## 2020-06-02 PROCEDURE — 1123F ACP DISCUSS/DSCN MKR DOCD: CPT | Performed by: INTERNAL MEDICINE

## 2020-06-02 PROCEDURE — 1036F TOBACCO NON-USER: CPT | Performed by: INTERNAL MEDICINE

## 2020-06-16 ENCOUNTER — TELEPHONE (OUTPATIENT)
Dept: UROLOGY | Facility: CLINIC | Age: 75
End: 2020-06-16

## 2020-06-16 NOTE — PROGRESS NOTES
Subjective    Mr. Shahid is 74 y.o. male    Chief Complaint: Erectile dysfunction    History of Present Illness  74-year-old male new patient here for erectile dysfunction and enlarged prostate.  Quality loss of function.  Severity stable.  Onset gradual.  Timing constant.  His LUTS are well controlled on alfuzosin.  He states sildenafil typically works for his ED but is interested in trying Cialis for spontaneity.  Associated symptoms he denies hematuria, dysuria, or flank pain.    The following portions of the patient's history were reviewed and updated as appropriate: allergies, current medications, past family history, past medical history, past social history, past surgical history and problem list.    Review of Systems   Constitutional: Negative for appetite change and fever.   HENT: Negative for hearing loss and sore throat.    Eyes: Negative for pain and redness.   Respiratory: Negative for cough and shortness of breath.    Cardiovascular: Negative for chest pain and leg swelling.   Gastrointestinal: Negative for anal bleeding, nausea and vomiting.   Endocrine: Negative for cold intolerance and heat intolerance.   Genitourinary: Negative for dysuria, flank pain, frequency, hematuria, penile pain, penile swelling, scrotal swelling, testicular pain and urgency.   Musculoskeletal: Negative for joint swelling and myalgias.   Skin: Negative for color change and rash.   Allergic/Immunologic: Negative for immunocompromised state.   Neurological: Negative for dizziness and speech difficulty.   Hematological: Negative for adenopathy. Does not bruise/bleed easily.   Psychiatric/Behavioral: Negative for dysphoric mood and suicidal ideas.         Current Outpatient Medications:   •  alfuzosin (UROXATRAL) 10 MG 24 hr tablet, Take 1 tablet by mouth Every Night., Disp: 90 tablet, Rfl: 3  •  ASPIRIN 81 PO, Aspir-81  daily, Disp: , Rfl:   •  atorvastatin (LIPITOR) 40 MG tablet, , Disp: , Rfl:   •  hydroCHLOROthiazide  "(MICROZIDE) 12.5 MG capsule, hydrochlorothiazide 12.5 mg capsule  TAKE 1 CAPSULE BY MOUTH  EVERY DAY, Disp: , Rfl:   •  lisinopril (PRINIVIL,ZESTRIL) 2.5 MG tablet, Take 2.5 mg by mouth., Disp: , Rfl:   •  Metoprolol-HCTZ ER 25-12.5 MG tablet sustained-release 24 hour, metoprolol succ 25 mg-hydrochlorothiazide 12.5 mg tablet,ext.rel 24 hr  Take 1 tablet every day by oral route., Disp: , Rfl:   •  Sildenafil Citrate (VIAGRA PO), sildenafil  20 mg prn, Disp: , Rfl:   •  sildenafil (REVATIO) 20 MG tablet, Take 2 to 5 tabs by mouth 1 hour prior to intercourse, Disp: 90 tablet, Rfl: 3  •  tadalafil (Cialis) 20 MG tablet, Take 1 tablet by mouth As Needed for Erectile Dysfunction., Disp: 10 tablet, Rfl: 5    Past Medical History:   Diagnosis Date   • Hypertension        History reviewed. No pertinent surgical history.    Social History     Socioeconomic History   • Marital status: Unknown     Spouse name: Not on file   • Number of children: Not on file   • Years of education: Not on file   • Highest education level: Not on file   Tobacco Use   • Smoking status: Never Smoker   • Smokeless tobacco: Never Used   Substance and Sexual Activity   • Alcohol use: Yes     Comment: social   • Drug use: Never       Family History   Problem Relation Age of Onset   • No Known Problems Father    • No Known Problems Mother        Objective    Temp 98.5 °F (36.9 °C)   Ht 175.3 cm (69\")   Wt 80.5 kg (177 lb 6.4 oz)   BMI 26.20 kg/m²     Physical Exam  Constitutional: Well nourished, Well developed; No apparent distress.  His vital signs are reviewed  Psychiatric: Appropriate affect; Alert and oriented  Eyes: Unremarkable  Musculoskeletal: Normal gait and station  GI: Abdomen is soft, non-tender  Respiratory: No distress; Unlabored movement; No accessory musculature needed with symmetric movements  Skin: No pallor or diaphoresis  ; Penis and testicles are normal; Prostate 30 mL without nodule      Results for orders placed or performed " in visit on 06/25/20   POC Urinalysis Dipstick, Multipro   Result Value Ref Range    Color Yellow Yellow, Straw, Dark Yellow, [place]    Clarity, UA Clear Clear    Glucose, UA Negative Negative, 1000 mg/dL (3+) mg/dL    Bilirubin Negative Negative    Ketones, UA Negative Negative    Specific Gravity  1.010 1.005 - 1.030    Blood, UA Trace (A) Negative    pH, Urine 5.0 5.0 - 8.0    Protein, POC Negative Negative mg/dL    Urobilinogen, UA Normal Normal    Nitrite, UA Negative Negative    Leukocytes Negative Negative     Assessment and Plan    Diagnoses and all orders for this visit:    Erectile dysfunction, unspecified erectile dysfunction type  -     POC Urinalysis Dipstick, Multipro    Erectile dysfunction due to diseases classified elsewhere  -     sildenafil (REVATIO) 20 MG tablet; Take 2 to 5 tabs by mouth 1 hour prior to intercourse  -     tadalafil (Cialis) 20 MG tablet; Take 1 tablet by mouth As Needed for Erectile Dysfunction.    Benign prostatic hyperplasia (BPH) with straining on urination  -     alfuzosin (UROXATRAL) 10 MG 24 hr tablet; Take 1 tablet by mouth Every Night.      Normal exam and UA.  He would like to have another prescription for sildenafil.  He was also given a written prescription for tadalafil to take to his local pharmacy.  LUTS well-controlled on alpha-blocker-continue.  Uroxatrol prescription refilled today.  Follow-up in 1 year or sooner as needed.      This document has been signed by MARIBEL Persaud MD on June 25, 2020 18:08

## 2020-06-16 NOTE — TELEPHONE ENCOUNTER
Pt called and stated he wanted his meds refilled [facility] to [facility] and I called to let him know he will need to make an apt since he had not ever seen dr perez at this facility.

## 2020-06-17 RX ORDER — LISINOPRIL 2.5 MG/1
2.5 TABLET ORAL DAILY
Qty: 90 TABLET | Refills: 3 | Status: SHIPPED | OUTPATIENT
Start: 2020-06-17

## 2020-06-17 RX ORDER — ATORVASTATIN CALCIUM 40 MG/1
40 TABLET, FILM COATED ORAL DAILY
Qty: 90 TABLET | Refills: 0 | Status: SHIPPED | OUTPATIENT
Start: 2020-06-17

## 2020-06-17 RX ORDER — HYDROCHLOROTHIAZIDE 12.5 MG/1
12.5 TABLET ORAL DAILY
Qty: 90 TABLET | Refills: 3 | Status: SHIPPED | OUTPATIENT
Start: 2020-06-17

## 2020-06-17 RX ORDER — METOPROLOL SUCCINATE 25 MG/1
12.5 TABLET, EXTENDED RELEASE ORAL DAILY
Qty: 90 TABLET | Refills: 3 | Status: SHIPPED | OUTPATIENT
Start: 2020-06-17 | End: 2022-08-15

## 2020-06-25 ENCOUNTER — OFFICE VISIT (OUTPATIENT)
Dept: UROLOGY | Facility: CLINIC | Age: 75
End: 2020-06-25

## 2020-06-25 VITALS — TEMPERATURE: 98.5 F | BODY MASS INDEX: 26.28 KG/M2 | HEIGHT: 69 IN | WEIGHT: 177.4 LBS

## 2020-06-25 DIAGNOSIS — N52.9 ERECTILE DYSFUNCTION, UNSPECIFIED ERECTILE DYSFUNCTION TYPE: Primary | ICD-10-CM

## 2020-06-25 DIAGNOSIS — R39.16 BENIGN PROSTATIC HYPERPLASIA (BPH) WITH STRAINING ON URINATION: ICD-10-CM

## 2020-06-25 DIAGNOSIS — N52.1 ERECTILE DYSFUNCTION DUE TO DISEASES CLASSIFIED ELSEWHERE: ICD-10-CM

## 2020-06-25 DIAGNOSIS — N40.1 BENIGN PROSTATIC HYPERPLASIA (BPH) WITH STRAINING ON URINATION: ICD-10-CM

## 2020-06-25 LAB
BILIRUB BLD-MCNC: NEGATIVE MG/DL
CLARITY, POC: CLEAR
COLOR UR: YELLOW
GLUCOSE UR STRIP-MCNC: NEGATIVE MG/DL
KETONES UR QL: NEGATIVE
LEUKOCYTE EST, POC: NEGATIVE
NITRITE UR-MCNC: NEGATIVE MG/ML
PH UR: 5 [PH] (ref 5–8)
PROT UR STRIP-MCNC: NEGATIVE MG/DL
RBC # UR STRIP: ABNORMAL /UL
SP GR UR: 1.01 (ref 1–1.03)
UROBILINOGEN UR QL: NORMAL

## 2020-06-25 PROCEDURE — 81003 URINALYSIS AUTO W/O SCOPE: CPT | Performed by: UROLOGY

## 2020-06-25 PROCEDURE — 99214 OFFICE O/P EST MOD 30 MIN: CPT | Performed by: UROLOGY

## 2020-06-25 RX ORDER — LISINOPRIL 2.5 MG/1
2.5 TABLET ORAL
COMMUNITY
Start: 2020-06-17

## 2020-06-25 RX ORDER — ATORVASTATIN CALCIUM 40 MG/1
TABLET, FILM COATED ORAL
COMMUNITY
Start: 2020-06-23

## 2020-06-25 RX ORDER — HYDROCHLOROTHIAZIDE 12.5 MG/1
CAPSULE, GELATIN COATED ORAL
COMMUNITY

## 2020-06-25 RX ORDER — ALFUZOSIN HYDROCHLORIDE 10 MG/1
10 TABLET, EXTENDED RELEASE ORAL
COMMUNITY
End: 2020-06-25 | Stop reason: SDUPTHER

## 2020-06-25 RX ORDER — METOPROLOL SUCCINATE AND HYDROCHLOROTHIAZIDE 25; 12.5 MG/1; MG/1
TABLET ORAL
COMMUNITY

## 2020-06-25 RX ORDER — SILDENAFIL CITRATE 20 MG/1
TABLET ORAL
Qty: 90 TABLET | Refills: 3 | Status: SHIPPED | OUTPATIENT
Start: 2020-06-25 | End: 2022-06-24

## 2020-06-25 RX ORDER — TADALAFIL 20 MG/1
20 TABLET ORAL AS NEEDED
Qty: 10 TABLET | Refills: 5 | Status: SHIPPED | OUTPATIENT
Start: 2020-06-25 | End: 2021-06-25 | Stop reason: SDUPTHER

## 2020-06-25 RX ORDER — ALFUZOSIN HYDROCHLORIDE 10 MG/1
10 TABLET, EXTENDED RELEASE ORAL NIGHTLY
Qty: 90 TABLET | Refills: 3 | Status: SHIPPED | OUTPATIENT
Start: 2020-06-25 | End: 2021-06-25 | Stop reason: SDUPTHER

## 2020-06-25 NOTE — PATIENT INSTRUCTIONS

## 2020-12-18 ENCOUNTER — OFFICE VISIT (OUTPATIENT)
Dept: CARDIOLOGY | Age: 75
End: 2020-12-18
Payer: MEDICARE

## 2020-12-18 VITALS
HEART RATE: 66 BPM | WEIGHT: 184 LBS | DIASTOLIC BLOOD PRESSURE: 68 MMHG | HEIGHT: 69 IN | BODY MASS INDEX: 27.25 KG/M2 | SYSTOLIC BLOOD PRESSURE: 122 MMHG

## 2020-12-18 PROCEDURE — G8484 FLU IMMUNIZE NO ADMIN: HCPCS | Performed by: CLINICAL NURSE SPECIALIST

## 2020-12-18 PROCEDURE — 1123F ACP DISCUSS/DSCN MKR DOCD: CPT | Performed by: CLINICAL NURSE SPECIALIST

## 2020-12-18 PROCEDURE — 1036F TOBACCO NON-USER: CPT | Performed by: CLINICAL NURSE SPECIALIST

## 2020-12-18 PROCEDURE — 93000 ELECTROCARDIOGRAM COMPLETE: CPT | Performed by: CLINICAL NURSE SPECIALIST

## 2020-12-18 PROCEDURE — G8417 CALC BMI ABV UP PARAM F/U: HCPCS | Performed by: CLINICAL NURSE SPECIALIST

## 2020-12-18 PROCEDURE — 4040F PNEUMOC VAC/ADMIN/RCVD: CPT | Performed by: CLINICAL NURSE SPECIALIST

## 2020-12-18 PROCEDURE — 99213 OFFICE O/P EST LOW 20 MIN: CPT | Performed by: CLINICAL NURSE SPECIALIST

## 2020-12-18 PROCEDURE — G8427 DOCREV CUR MEDS BY ELIG CLIN: HCPCS | Performed by: CLINICAL NURSE SPECIALIST

## 2020-12-18 NOTE — PATIENT INSTRUCTIONS
Follow up in 6 mos   Call with any questions or concerns  Follow up with CARLOS MANUEL Sainz CNP for non cardiac problems  Report any new problems  Cardiovascular Fitness-Exercise as tolerated. Strive for 30 minutes of exercise most days of the week. Cardiac / Healthy Diet  Continue current medications as directed  Continue plan of treatment  It is always recommended that you bring your medications bottles with you to each visit - this is for your safety!

## 2020-12-18 NOTE — PROGRESS NOTES
[facility] Cardiology  [address] [place] [address] Drive Matheus Horton [address], [address]  Phone: [phone]  Fax: [phone]    OFFICE VISIT:  2020    Luisa Dudley - : 1945    Reason For Visit:  [facility] is a 67 y.o. male who is here for 6 Month Follow-Up (no cardiac symptoms) and Coronary Artery Disease  16  Acute inferior ST segment elevation MI, received TNKase at [facility], AUC indication 2, [facility] score 9[id number]  Cath 90% mid LAD 2.25 x 15 and 2.25 x 18 KAVITA, o/w luminals, apical dyskinesis, EF 45%, circ off RCA  18  SE Positive for apical and anterior septal myocardial ischemia, intermediate risk findings, AUC indication 16, AUC score 4  18  Cath patent stents in LAD, 80% distal RCA, 2.5 x 15 KAVITA, anterior lateral hypokineis, EF 45%    Continued on ongoing medical management. He returns today for routine follow-up. Active and doing well. No change of activity tolerance. Subjective  [facility] denies exertional chest pain, shortness of breath, orthopnea, paroxysmal nocturnal dyspnea, syncope, presyncope, arrhythmia, edema and fatigue. The patient denies numbness or weakness to suggest cerebrovascular accident or transient ischemic attack. CARLOS MANUEL Sainz CNP is PCP and follows labs.     Luisa Dudley has the following history as recorded in [facility]:    Patient Active Problem List    Diagnosis Date Noted    Bradycardia 2019    Essential hypertension 2019    History of placement of stent in LAD coronary artery 2019    Hyperlipidemia     [facility] [address] [address]     Left ventricular dysfunction     CAD (coronary artery disease) 2016    ED (erectile dysfunction) 2016     Past Medical History:   Diagnosis Date    Brewer esophagus     Dr Dangelo Nicole CAD (coronary artery disease) 2016  Acute inferior ST segment elevation MI, received TNKase at [facility], AUC indication 2, AUC score 9, [facility] [id number] 2/29/16  Cath 90% mid LAD 2.25 x 15 and 2.25 x 18 KAVITA, o/w luminals, apical dyskinesis, EF 45%    Cancer (HCC)     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    ED (erectile dysfunction) 2/29/2016    GERD (gastroesophageal reflux disease)     Hyperlipidemia     Kidney disease stage 3     Left ventricular dysfunction     MI (myocardial infarction) ([address]      Past Surgical History:   Procedure Laterality Date    CARDIAC CATHETERIZATION      CARDIAC CATHETERIZATION      COLONOSCOPY      CORONARY ANGIOPLASTY WITH STENT PLACEMENT      HAND SURGERY      SKIN BIOPSY       Family History   Problem Relation Age of Onset    Heart Disease Mother      Social History     Tobacco Use    Smoking status: Former Smoker     Types: Cigarettes    Smokeless tobacco: Never Used   Substance Use Topics    Alcohol use:  Yes     Alcohol/week: 0.0 standard drinks      Current Outpatient Medications   Medication Sig Dispense Refill    metoprolol succinate (TOPROL XL) 25 MG extended release tablet Take 0.5 tablets by mouth daily 90 tablet 3    lisinopril (PRINIVIL;ZESTRIL) 2.5 MG tablet Take 1 tablet by mouth daily 90 tablet 3    hydrochlorothiazide (HYDRODIURIL) 12.5 MG tablet Take 1 tablet by mouth daily 90 tablet 3    atorvastatin (LIPITOR) 40 MG tablet Take 1 tablet by mouth daily 90 tablet 0    nitroGLYCERIN (NITROSTAT) 0.4 MG SL tablet Place 1 tablet under the tongue every 5 minutes as needed for Chest pain 25 tablet 3    loratadine (CLARITIN) 10 MG capsule Take 10 mg by mouth as needed      diphenhydrAMINE-APAP, sleep, (TYLENOL PM EXTRA STRENGTH)  MG tablet Take 1 tablet by mouth nightly      Multiple Vitamins-Minerals (THERAPEUTIC MULTIVITAMIN-MINERALS) tablet Take 1 tablet by mouth daily      Magnesium 500 MG CAPS Take 500 mg by mouth daily      sildenafil (REVATIO) 20 MG tablet Take 20 mg by mouth as needed      alfuzosin (UROXATRAL) 10 MG extended release tablet Take 10 mg by mouth daily      aspirin 81 MG tablet Take 81 mg by mouth daily      ranitidine (ZANTAC) 150 MG tablet Take 150 mg by mouth 2 times daily       No current facility-administered medications for this visit. Allergies: Patient has no known allergies. Review of Systems  Constitutional - no significant activity change, appetite change, or unexpected weight change. No fever, chills or diaphoresis. No fatigue. HEENT - no significant rhinorrhea or epistaxis. No tinnitus or significant hearing loss. Eyes - no sudden vision change or amaurosis. Respiratory - no significant wheezing, stridor, apnea or cough. No dyspnea on exertion or shortness of breath. Cardiovascular - no exertional chest pain, orthopnea or PND. No sensation of arrhythmia or slow heart rate. No claudication or leg edema. Gastrointestinal - no abdominal swelling or pain. No blood in stool. No severe constipation, diarrhea, nausea, or vomiting. Genitourinary - no difficulty urinating, dysuria, frequency, or urgency. No flank pain or hematuria. Musculoskeletal - no back pain, gait disturbance, or myalgia. Skin - no color change or rash. No pallor. No new surgical incision. Neurologic - no speech difficulty, facial asymmetry or lateralizing weakness. No seizures, presyncope, syncope, or significant dizziness. Hematologic - no easy bruising or excessive bleeding. Psychiatric - no severe anxiety or insomnia. No confusion. All other review of systems are negative. Objective  Vital Signs - /68   Pulse 66   Ht 5' 9\" (1.753 m)   Wt 184 lb (83.5 kg)   BMI 27.17 kg/m²   General - Emilia Winter is alert, cooperative, and pleasant. Well groomed. No acute distress. Body habitus is normal.  HEENT - The head is normocephalic. No circumoral cyanosis. Dentition is normal.   EYES -  No Xanthelasma, no arcus senilis, no conjunctival hemorrhages or discharge. Neck - Supple, without increased jugular venous pressures. No carotid bruits.   No mass.   Respiratory - Lungs are clear bilaterally. No wheezes or rales. Normal effort without use of accessory muscles. Cardiovascular - Heart has regular rhythm and rate. No murmurs, rubs or gallops. + pedal pulses and no varicosities. Abdominal -  Soft, nontender, nondistended. Bowel sounds are intact. Extremities - No clubbing, cyanosis, or  edema. Musculoskeletal -  No clubbing . No Osler's nodes. Gait normal .  No kyphosis or scoliosis. Skin -  no statis ulcers or dermatitis. Neurological - No focal signs are identified. Oriented to person, place and time. Psychiatric -  Appropriate affect and mood. Assessment:     Diagnosis Orders   1. Coronary artery disease involving native coronary artery of native heart without angina pectoris     2. Essential hypertension  EKG 12 lead   3. Mixed hyperlipidemia       Data:  BP Readings from Last 3 Encounters:   12/18/20 122/68   06/02/20 104/62   10/08/19 118/88    Pulse Readings from Last 3 Encounters:   12/18/20 66   06/02/20 60   10/08/19 60        Wt Readings from Last 3 Encounters:   12/18/20 184 lb (83.5 kg)   06/02/20 177 lb (80.3 kg)   10/08/19 182 lb (82.6 kg)     EKG today shows normal sinus rhythm with a rate of 66. First-degree AV block. Right bundle branch block with extensive old anterolateral infarct. Compared to previous EKG is unchanged    Blood pressure and heart rate controlled. Medical management includes  Beta-blocker, ACE inhibitor statin and aspirin  PCP managing labs. Goes to [place] for the winter states taking medications as prescribed  Stable cardiovascular status. No evidence of overt heart failure, angina or dysrhythmia. Plan    Follow up in 6 mos   Call with any questions or concerns  Follow up with CARLOS MANUEL Krueger CNP for non cardiac problems  Report any new problems  Cardiovascular Fitness-Exercise as tolerated. Strive for 30 minutes of exercise most days of the week.     Cardiac / Healthy

## 2021-06-18 NOTE — PROGRESS NOTES
Subjective    Mr. Shahid is 75 y.o. male    Chief Complaint: Erectile Dysfunction    History of Present Illness    75-year-old male established patient followup for for erectile dysfunction and enlarged prostate.   His LUTS are well controlled on alfuzosin.  He states sildenafil typically works for his ED but also alternates with tadalafil.   Associated symptoms he denies hematuria, dysuria, or flank pain.  UA today is clear.      The following portions of the patient's history were reviewed and updated as appropriate: allergies, current medications, past family history, past medical history, past social history, past surgical history and problem list.    Review of Systems      Current Outpatient Medications:   •  alfuzosin (UROXATRAL) 10 MG 24 hr tablet, Take 1 tablet by mouth Every Night., Disp: 90 tablet, Rfl: 3  •  ASPIRIN 81 PO, Aspir-81  daily, Disp: , Rfl:   •  atorvastatin (LIPITOR) 40 MG tablet, , Disp: , Rfl:   •  hydroCHLOROthiazide (MICROZIDE) 12.5 MG capsule, hydrochlorothiazide 12.5 mg capsule  TAKE 1 CAPSULE BY MOUTH  EVERY DAY, Disp: , Rfl:   •  lisinopril (PRINIVIL,ZESTRIL) 2.5 MG tablet, Take 2.5 mg by mouth., Disp: , Rfl:   •  Metoprolol-HCTZ ER 25-12.5 MG tablet sustained-release 24 hour, metoprolol succ 25 mg-hydrochlorothiazide 12.5 mg tablet,ext.rel 24 hr  Take 1 tablet every day by oral route., Disp: , Rfl:   •  sildenafil (REVATIO) 20 MG tablet, Take 2 to 5 tabs by mouth 1 hour prior to intercourse, Disp: 90 tablet, Rfl: 3  •  Sildenafil Citrate (VIAGRA PO), sildenafil  20 mg prn, Disp: , Rfl:   •  tadalafil (Cialis) 20 MG tablet, Take 1 tablet by mouth As Needed for Erectile Dysfunction., Disp: 10 tablet, Rfl: 5  •  finasteride (PROSCAR) 5 MG tablet, Take 1 tablet by mouth Daily., Disp: 90 tablet, Rfl: 3    Past Medical History:   Diagnosis Date   • Hypertension        History reviewed. No pertinent surgical history.    Social History     Socioeconomic History   • Marital status: Unknown      "Spouse name: Not on file   • Number of children: Not on file   • Years of education: Not on file   • Highest education level: Not on file   Tobacco Use   • Smoking status: Never Smoker   • Smokeless tobacco: Never Used   Substance and Sexual Activity   • Alcohol use: Yes     Comment: social   • Drug use: Never       Family History   Problem Relation Age of Onset   • No Known Problems Father    • No Known Problems Mother        Objective    Temp 97.2 °F (36.2 °C)   Ht 177.8 cm (70\")   Wt 82.1 kg (181 lb)   BMI 25.97 kg/m²     Physical Exam        Results for orders placed or performed in visit on 06/25/21   POC Urinalysis Dipstick, Multipro    Specimen: Urine   Result Value Ref Range    Color Yellow Yellow, Straw, Dark Yellow, [place]    Clarity, UA Clear Clear    Glucose, UA Negative Negative, 1000 mg/dL (3+) mg/dL    Bilirubin Negative Negative    Ketones, UA Negative Negative    Specific Gravity  1.020 1.005 - 1.030    Blood, UA Negative Negative    pH, Urine 5.0 5.0 - 8.0    Protein, POC Negative Negative mg/dL    Urobilinogen, UA Normal Normal    Nitrite, UA Negative Negative    Leukocytes Negative Negative     Assessment and Plan    Diagnoses and all orders for this visit:    1. Erectile dysfunction, unspecified erectile dysfunction type (Primary)  -     POC Urinalysis Dipstick, Multipro    2. Benign prostatic hyperplasia (BPH) with straining on urination  -     alfuzosin (UROXATRAL) 10 MG 24 hr tablet; Take 1 tablet by mouth Every Night.  Dispense: 90 tablet; Refill: 3    3. Erectile dysfunction due to diseases classified elsewhere  -     tadalafil (Cialis) 20 MG tablet; Take 1 tablet by mouth As Needed for Erectile Dysfunction.  Dispense: 10 tablet; Refill: 5    Other orders  -     finasteride (PROSCAR) 5 MG tablet; Take 1 tablet by mouth Daily.  Dispense: 90 tablet; Refill: 3      Continue alfuzosin.  He may add finasteride for combination therapy if he wishes.  Continue PDE inhibitor therapy for ED.  " Follow-up in 1 year or sooner as needed.         This document has been signed by MARIBEL Persaud MD on June 26, 2021 15:00 CDT

## 2021-06-25 ENCOUNTER — OFFICE VISIT (OUTPATIENT)
Dept: UROLOGY | Facility: CLINIC | Age: 76
End: 2021-06-25

## 2021-06-25 VITALS — BODY MASS INDEX: 25.91 KG/M2 | TEMPERATURE: 97.2 F | HEIGHT: 70 IN | WEIGHT: 181 LBS

## 2021-06-25 DIAGNOSIS — N52.1 ERECTILE DYSFUNCTION DUE TO DISEASES CLASSIFIED ELSEWHERE: ICD-10-CM

## 2021-06-25 DIAGNOSIS — R39.16 BENIGN PROSTATIC HYPERPLASIA (BPH) WITH STRAINING ON URINATION: ICD-10-CM

## 2021-06-25 DIAGNOSIS — N40.1 BENIGN PROSTATIC HYPERPLASIA (BPH) WITH STRAINING ON URINATION: ICD-10-CM

## 2021-06-25 DIAGNOSIS — N52.9 ERECTILE DYSFUNCTION, UNSPECIFIED ERECTILE DYSFUNCTION TYPE: Primary | ICD-10-CM

## 2021-06-25 LAB
BILIRUB BLD-MCNC: NEGATIVE MG/DL
CLARITY, POC: CLEAR
COLOR UR: YELLOW
GLUCOSE UR STRIP-MCNC: NEGATIVE MG/DL
KETONES UR QL: NEGATIVE
LEUKOCYTE EST, POC: NEGATIVE
NITRITE UR-MCNC: NEGATIVE MG/ML
PH UR: 5 [PH] (ref 5–8)
PROT UR STRIP-MCNC: NEGATIVE MG/DL
RBC # UR STRIP: NEGATIVE /UL
SP GR UR: 1.02 (ref 1–1.03)
UROBILINOGEN UR QL: NORMAL

## 2021-06-25 PROCEDURE — 99214 OFFICE O/P EST MOD 30 MIN: CPT | Performed by: UROLOGY

## 2021-06-25 PROCEDURE — 81003 URINALYSIS AUTO W/O SCOPE: CPT | Performed by: UROLOGY

## 2021-06-25 RX ORDER — FINASTERIDE 5 MG/1
5 TABLET, FILM COATED ORAL DAILY
Qty: 90 TABLET | Refills: 3 | Status: SHIPPED | OUTPATIENT
Start: 2021-06-25 | End: 2022-06-24 | Stop reason: SDUPTHER

## 2021-06-25 RX ORDER — ALFUZOSIN HYDROCHLORIDE 10 MG/1
10 TABLET, EXTENDED RELEASE ORAL NIGHTLY
Qty: 90 TABLET | Refills: 3 | Status: SHIPPED | OUTPATIENT
Start: 2021-06-25 | End: 2022-06-24 | Stop reason: SDUPTHER

## 2021-06-25 RX ORDER — TADALAFIL 20 MG/1
20 TABLET ORAL AS NEEDED
Qty: 10 TABLET | Refills: 5 | Status: SHIPPED | OUTPATIENT
Start: 2021-06-25 | End: 2022-06-24 | Stop reason: SDUPTHER

## 2021-06-29 ENCOUNTER — OFFICE VISIT (OUTPATIENT)
Dept: CARDIOLOGY CLINIC | Age: 76
End: 2021-06-29
Payer: MEDICARE

## 2021-06-29 VITALS
DIASTOLIC BLOOD PRESSURE: 64 MMHG | HEART RATE: 68 BPM | SYSTOLIC BLOOD PRESSURE: 118 MMHG | HEIGHT: 69 IN | WEIGHT: 181 LBS | BODY MASS INDEX: 26.81 KG/M2

## 2021-06-29 DIAGNOSIS — I21.19 ST-SEGMENT ELEVATION MYOCARDIAL INFARCTION (STEMI) OF INFERIOR WALL (HCC): Primary | ICD-10-CM

## 2021-06-29 DIAGNOSIS — Z95.5 HISTORY OF PLACEMENT OF STENT IN LAD CORONARY ARTERY: ICD-10-CM

## 2021-06-29 DIAGNOSIS — I25.10 CORONARY ARTERY DISEASE INVOLVING NATIVE CORONARY ARTERY OF NATIVE HEART WITHOUT ANGINA PECTORIS: ICD-10-CM

## 2021-06-29 DIAGNOSIS — I51.9 LEFT VENTRICULAR DYSFUNCTION: ICD-10-CM

## 2021-06-29 PROCEDURE — 4040F PNEUMOC VAC/ADMIN/RCVD: CPT | Performed by: INTERNAL MEDICINE

## 2021-06-29 PROCEDURE — G8417 CALC BMI ABV UP PARAM F/U: HCPCS | Performed by: INTERNAL MEDICINE

## 2021-06-29 PROCEDURE — 99214 OFFICE O/P EST MOD 30 MIN: CPT | Performed by: INTERNAL MEDICINE

## 2021-06-29 PROCEDURE — 1123F ACP DISCUSS/DSCN MKR DOCD: CPT | Performed by: INTERNAL MEDICINE

## 2021-06-29 PROCEDURE — 1036F TOBACCO NON-USER: CPT | Performed by: INTERNAL MEDICINE

## 2021-06-29 PROCEDURE — G8427 DOCREV CUR MEDS BY ELIG CLIN: HCPCS | Performed by: INTERNAL MEDICINE

## 2021-06-29 NOTE — PROGRESS NOTES
obtain a Lexiscan dual-isotope. Continue aspirin, metoprolol succinate, and nitroglycerin as needed  2. Hypertension -adequate control. Continue metoprolol, lisinopril, and hydrochlorothiazide. 3.  Dyslipidemia -September 2020 values LDL 63, HDL 47, triglycerides 123. Continue Lipitor 40  4.   Pandemic response -appropriate/vaccinated

## 2021-06-29 NOTE — PATIENT INSTRUCTIONS
[place] at the [facility] and [address] [facility] located on the first floor of [place] [address] through hospital main entrance and turn immediately to your left. Patient's contact number:  [phone] (home)      Lexiscan Stress Test      Lexiscan (regadenoson injection) is a prescription drug given through an IV line that increases blood flow through the arteries of the heart during a cardiac nuclear stress test.     There are two parts to a Lexiscan stress test: the rest portion and the exercise portion. For the rest portion, a radioactive tracer is injected into your arm through the IV. After 30 to 60 minutes, the process of imaging will begin. A nuclear camera will be placed on your chest area and images are taken for the next 15 to 20 minutes. For the exercise portion, a nurse will attach EKG electrodes to your chest to monitor your heart rate. The drug Doraine Engman is administered to simulate stress on the heart. Your heart rhythm will then be monitored for the next few minutes. Your blood pressure will also be monitored throughout the exercise portion. [place] through the exercise portion, a second round of radioactive tracer is injected into your body. Your heart rate and EKG will be monitored for another few minutes after administering the drug. Test Preparation:     Bring a list of your current medications. Do not take any of your medications the morning of the test, but bring all morning medications with you as you will take them after the stress portion of the test is completed.  Do not eat Bananas 24 hours prior to test.     No caffeine 24 hours prior to the testing. This includes: coffee, pop/soda, chocolate, cold medications, etc.  Any product that might contain caffeine.  No nicotine or alcohol 12 hours prior to your test.    Nothing to eat or drink 6-8 hours prior to appointment time.   It is okay to drink small amounts of water during the four hours prior to the test.   Nitroglycerin patches must be taken off 1 hour before testing.  Wear comfortable clothing.  Please refrain from any strenuous exercise or activities the day before your test, or the day of your test.   The Nuclear Lexiscan Stress test takes about 2 ½ to 3 hours to complete. If for any reason you are unable to keep this appointment, please contact Outpatient Scheduling, [phone], as soon as possible to reschedule. [place] at [facility] [address] [facility] and [address] [facility] located on the first floor of [place] [address] through hospital main entrance and turn immediately to your left. Date/Time:     Patient's contact number:  [phone] (home)     Echocardiogram -  No prep. Takes approximately 30 min. An echocardiogram uses sound waves to produce images of your heart. This commonly used test allows your doctor to see how your heart is beating and pumping blood. Your doctor can use the images from an echocardiogram to identify various abnormalities in the heart muscle and valves. This test has 2 parts:   Ø You will be asked to disrobe from the waist up and given a gown to wear. The technologist will then hook up an EKG monitor to you for the entire exam.   Ø You will then have an ultrasound of your heart (echocardiogram) to assess the heart muscle, heart valves and heart function. You may eat and take any medicines before the exam.     If you need to change your appointment, please call outpatient scheduling at [phone].

## 2021-07-22 ENCOUNTER — HOSPITAL ENCOUNTER (OUTPATIENT)
Dept: NON INVASIVE DIAGNOSTICS | Age: 76
Discharge: HOME | End: 2021-07-22
Payer: MEDICARE

## 2021-07-22 ENCOUNTER — HOSPITAL ENCOUNTER (OUTPATIENT)
Dept: NUCLEAR MEDICINE | Age: 76
Discharge: HOME | End: 2021-07-24
Payer: MEDICARE

## 2021-07-22 DIAGNOSIS — I21.19 ST-SEGMENT ELEVATION MYOCARDIAL INFARCTION (STEMI) OF INFERIOR WALL (HCC): ICD-10-CM

## 2021-07-22 DIAGNOSIS — I25.10 CORONARY ARTERY DISEASE INVOLVING NATIVE CORONARY ARTERY OF NATIVE HEART WITHOUT ANGINA PECTORIS: ICD-10-CM

## 2021-07-22 DIAGNOSIS — Z95.5 HISTORY OF PLACEMENT OF STENT IN LAD CORONARY ARTERY: ICD-10-CM

## 2021-07-22 DIAGNOSIS — I51.9 LEFT VENTRICULAR DYSFUNCTION: ICD-10-CM

## 2021-07-22 LAB
LV EF: 55 %
LV EF: 58 %
LVEF MODALITY: NORMAL
LVEF MODALITY: NORMAL

## 2021-07-22 PROCEDURE — 6360000002 HC RX W HCPCS: Performed by: INTERNAL MEDICINE

## 2021-07-22 PROCEDURE — 6360000004 HC RX CONTRAST MEDICATION: Performed by: INTERNAL MEDICINE

## 2021-07-22 PROCEDURE — 3430000000 HC RX DIAGNOSTIC RADIOPHARMACEUTICAL: Performed by: INTERNAL MEDICINE

## 2021-07-22 PROCEDURE — C8929 TTE W OR WO FOL WCON,DOPPLER: HCPCS

## 2021-07-22 PROCEDURE — 78452 HT MUSCLE IMAGE SPECT MULT: CPT

## 2021-07-22 PROCEDURE — A9500 TC99M SESTAMIBI: HCPCS | Performed by: INTERNAL MEDICINE

## 2021-07-22 RX ADMIN — TETRAKIS(2-METHOXYISOBUTYLISOCYANIDE)COPPER(I) TETRAFLUOROBORATE 10 MILLICURIE: 1 INJECTION, POWDER, LYOPHILIZED, FOR SOLUTION INTRAVENOUS at 13:38

## 2021-07-22 RX ADMIN — TETRAKIS(2-METHOXYISOBUTYLISOCYANIDE)COPPER(I) TETRAFLUOROBORATE 30 MILLICURIE: 1 INJECTION, POWDER, LYOPHILIZED, FOR SOLUTION INTRAVENOUS at 13:38

## 2021-07-22 RX ADMIN — PERFLUTREN 1.65 MG: 6.52 INJECTION, SUSPENSION INTRAVENOUS at 10:06

## 2021-07-29 ENCOUNTER — OFFICE VISIT (OUTPATIENT)
Dept: CARDIOLOGY CLINIC | Age: 76
End: 2021-07-29
Payer: MEDICARE

## 2021-07-29 VITALS
BODY MASS INDEX: 26.36 KG/M2 | HEART RATE: 65 BPM | WEIGHT: 178 LBS | SYSTOLIC BLOOD PRESSURE: 120 MMHG | DIASTOLIC BLOOD PRESSURE: 62 MMHG | HEIGHT: 69 IN | OXYGEN SATURATION: 99 %

## 2021-07-29 DIAGNOSIS — I25.10 CORONARY ARTERY DISEASE INVOLVING NATIVE CORONARY ARTERY OF NATIVE HEART WITHOUT ANGINA PECTORIS: Primary | ICD-10-CM

## 2021-07-29 DIAGNOSIS — I51.89 GRADE II DIASTOLIC DYSFUNCTION: ICD-10-CM

## 2021-07-29 DIAGNOSIS — Z95.5 HISTORY OF PLACEMENT OF STENT IN LAD CORONARY ARTERY: ICD-10-CM

## 2021-07-29 DIAGNOSIS — I10 ESSENTIAL HYPERTENSION: ICD-10-CM

## 2021-07-29 DIAGNOSIS — I25.2 HISTORY OF MYOCARDIAL INFARCTION: ICD-10-CM

## 2021-07-29 DIAGNOSIS — E78.2 MIXED HYPERLIPIDEMIA: ICD-10-CM

## 2021-07-29 PROCEDURE — 4040F PNEUMOC VAC/ADMIN/RCVD: CPT | Performed by: NURSE PRACTITIONER

## 2021-07-29 PROCEDURE — 99214 OFFICE O/P EST MOD 30 MIN: CPT | Performed by: NURSE PRACTITIONER

## 2021-07-29 PROCEDURE — G8417 CALC BMI ABV UP PARAM F/U: HCPCS | Performed by: NURSE PRACTITIONER

## 2021-07-29 PROCEDURE — 1036F TOBACCO NON-USER: CPT | Performed by: NURSE PRACTITIONER

## 2021-07-29 PROCEDURE — 1123F ACP DISCUSS/DSCN MKR DOCD: CPT | Performed by: NURSE PRACTITIONER

## 2021-07-29 PROCEDURE — G8427 DOCREV CUR MEDS BY ELIG CLIN: HCPCS | Performed by: NURSE PRACTITIONER

## 2021-07-29 NOTE — PROGRESS NOTES
[facility] 2012;[id number] 2/29/16  Cath 90% mid LAD 2.25 x 15 and 2.25 x 18 KAVITA, o/w luminals, apical dyskinesis, EF 45%    Cancer (HCC)     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    ED (erectile dysfunction) 2/29/2016    GERD (gastroesophageal reflux disease)     Hyperlipidemia     Kidney disease stage 3     Left ventricular dysfunction     MI (myocardial infarction) ([address]      Past Surgical History:   Procedure Laterality Date    CARDIAC CATHETERIZATION      CARDIAC CATHETERIZATION      COLONOSCOPY      CORONARY ANGIOPLASTY WITH STENT PLACEMENT      HAND SURGERY      SKIN BIOPSY       Family History   Problem Relation Age of Onset    Heart Disease Mother      Social History     Tobacco Use    Smoking status: Former Smoker     Types: Cigarettes    Smokeless tobacco: Never Used   Substance Use Topics    Alcohol use:  Yes     Alcohol/week: 0.0 standard drinks      Current Outpatient Medications   Medication Sig Dispense Refill    COVID-19 mRNA Virus Vaccine (PFIZER-BIONTECH COVID-19 VACC IM) Inject into the muscle Both doses      metoprolol succinate (TOPROL XL) 25 MG extended release tablet Take 0.5 tablets by mouth daily 90 tablet 3    lisinopril (PRINIVIL;ZESTRIL) 2.5 MG tablet Take 1 tablet by mouth daily 90 tablet 3    hydrochlorothiazide (HYDRODIURIL) 12.5 MG tablet Take 1 tablet by mouth daily 90 tablet 3    atorvastatin (LIPITOR) 40 MG tablet Take 1 tablet by mouth daily 90 tablet 0    nitroGLYCERIN (NITROSTAT) 0.4 MG SL tablet Place 1 tablet under the tongue every 5 minutes as needed for Chest pain 25 tablet 3    loratadine (CLARITIN) 10 MG capsule Take 10 mg by mouth as needed      diphenhydrAMINE-APAP, sleep, (TYLENOL PM EXTRA STRENGTH)  MG tablet Take 1 tablet by mouth nightly      Multiple Vitamins-Minerals (THERAPEUTIC MULTIVITAMIN-MINERALS) tablet Take 1 tablet by mouth daily      Magnesium 500 MG CAPS Take 500 mg by mouth daily      sildenafil (REVATIO) 20 MG tablet Take 20 mg by mouth as needed      alfuzosin (UROXATRAL) 10 MG extended release tablet Take 10 mg by mouth daily      aspirin 81 MG tablet Take 81 mg by mouth daily       No current facility-administered medications for this visit. Allergies: Patient has no known allergies. Review of Systems  Constitutional - no appetite change, or unexpected weight change. No fever, chills or diaphoresis. No significant change in activity level or new onset of fatigue. HEENT - no significant rhinorrhea or epistaxis. No tinnitus or significant hearing loss. Eyes - no sudden vision change or amaurosis. No corneal arcus, xantholasma, subconjunctival hemorrhage or discharge. Respiratory - no significant wheezing, stridor, apnea or cough. No dyspnea on exertion or shortness of air. Cardiovascular - no exertional chest pain to suggest myocardial ischemia. No orthopnea or PND. No sensation of sustained arrythmia. No occurrence of slow heart rate. No palpitations. No claudication. Gastrointestinal - no abdominal swelling or pain. No blood in stool. No severe constipation, diarrhea, nausea, or vomiting. Genitourinary - no dysuria, frequency, or urgency. No flank pain or hematuria. Musculoskeletal - no back pain or myalgia. No problems with gait. Extremities - no clubbing, cyanosis or extremity edema. Skin - no color change or rash. No pallor. No new surgical incision. Neurologic - no speech difficulty, facial asymmetry or lateralizing weakness. No seizures, presyncope or syncope. No significant dizziness. Hematologic - no easy bruising or excessive bleeding. Psychiatric - no severe anxiety or insomnia. No confusion. All other review of systems are negative. Objective  Vital Signs - /62   Pulse 65   Ht 5' 9\" (1.753 m)   Wt 178 lb (80.7 kg)   SpO2 99%   BMI 26.29 kg/m²   General - Enrrique Jerome is alert, cooperative, and pleasant. Well groomed. No acute distress.     Body habitus - Body mass index is 26.29 kg/m². HEENT - Head is normocephalic. No circumoral cyanosis. Dentition is normal.  EYES -   Lids normal without ptosis. No discharge, edema or subconjunctival hemorrhage. Neck - Symmetrical without apparent mass or lymphadenopathy. Respiratory - Normal respiratory effort without use of accessory muscles. Ausculatation reveals vesicular breath sounds without crackles, wheezes, rub or rhonchi. Cardiovascular - No jugular venous distention. Auscultation reveals regular rate and rhythm. No audible clicks, gallop or rub. No murmur. No lower extremity varicosities. No carotid bruits. Abdominal -  No visible distention, mass or pulsations. Extremities - No clubbing or cyanosis. No statis dermatitis or ulcers. No edema. Musculoskeletal -   No Osler's nodes. No kyphosis or scoliosis. Gait is even and regular without limp or shuffle. Ambulates without assistance. Skin -  Warm and dry; no rash or pallor. No new surgical wound. Neurological - No focal neurological deficits. Thought processes coherent. No apparent tremor. Oriented to person, place and time. Psychiatric -  Appropriate affect and mood. Data reviewed:  Prior Cardiac History -   2/29/16  Acute inferior ST segment elevation MI, [facility], AUC indication 2, AUC score 9, [facility] [id number] 2/29/16  Cath 90% mid LAD 2.25 x 15 and 2.25 x 18 KAVITA, o/w luminals, apical dyskinesis, EF 45%, circ off RCA  2/22/18  SE Positive for apical and anterior septal myocardial ischemia, intermediate risk findings, AUC indication 16, AUC score 4  2/22/18  Cath patent stents in LAD, 80% distal RCA, 2.5 x 15 KAVITA, anterior lateral hypokineis, EF 45%    7/22/21 Lexiscan  Conclusion:   SPECT perfusion imaging study showed large area, severe intensity of   fixed inferoapical defect   Gated wall motion study shows normal wall motion with calculated   ejection fraction of 58%   Stress EKG was performed.  This was a submaximal exercise test. Patient   only achieved 4.6 METS there was no further ST-T wave changes or chest   pain.  Apparently there was reported ventricular trigeminy during   exercise   Signed by Dr Sakina Matos     7/22/21 echo   Normal left ventricular size with distal apical hypokinesis but preserved  overall ejection fraction at 55%   Normal left ventricular wall thickness with probable grade 2 [moderate]  diastolic dysfunction   Normal left atrial size   Poor visualization of the aortic valve which appears to be tricuspid with  adequate cusp separation and no significant stenosis or insufficiency   Normally mobile mitral valve with no demonstrable regurgitation   No pulmonic stenosis or insufficiency demonstrated   Normal right-sided chambers with preserved RV systolic function   No tricuspid regurgitation with which to estimate RVSP   Normal IVC dimension and motion consistent with normal right atrial   filling pressures   Aortic root and ascending segments measured within normal limits   No significant pericardial effusion   Definity contrast utilized to better defined endocardial borders    Signature   Electronically signed by Juan Jose Lomas MD(Interpreting physician)   on 07/22/2021 10:40 AM    Lab Results   Component Value Date    WBC 7.9 03/02/2016    HGB 13.3 (L) 03/02/2016    HCT 39.9 (L) 03/02/2016    MCV 96.4 (H) 03/02/2016     03/02/2016     Lab Results   Component Value Date     02/23/2018    K 4.6 02/23/2018     02/23/2018    CO2 24 02/23/2018    BUN 19 02/23/2018    CREATININE 1.1 02/23/2018    GLUCOSE 115 (H) 02/23/2018    CALCIUM 9.1 02/23/2018    PROT 6.2 (L) 03/01/2016    LABALBU 3.7 03/01/2016    BILITOT 0.3 03/01/2016    ALKPHOS 59 03/01/2016     (H) 03/01/2016    ALT 28 03/01/2016    LABGLOM >60 02/23/2018    GLOB 2.5 03/01/2016       Lab Results   Component Value Date    CHOL 131 (L) 02/22/2018    CHOL 177 03/01/2016     Lab Results   Component Value Date    TRIG 126 Heart health. [facility].      Ian Mckeon, APRN

## 2021-07-29 NOTE — PATIENT INSTRUCTIONS
New instructions for today:  How to take:  NITROGLYCERIN (Nitrostat) 0.4 mg tablets, sublingual.  Nitroglycerin is in a group of drugs called nitrates. Nitroglycerin dilates (widens) blood vessels, making it easier for blood to flow through them and easier for the heart to pump. Dosing Guidelines for Nitroglycerin Tablets  · At the start of an angina (chest pain) attack, place one tablet under the tongue or between the cheek and gum. Do not swallow or chew the tablet; let it dissolve on its own. If necessary, a second and third tablet may be used, with five minutes between using each tablet. If you use a third tablet and your chest pain continues, it is time to seek immediate medical attention. Call 911 immediately and have someone drive you to the emergency room. You may be having a heart attack or other serious heart problem. · To prevent angina from exercise or stress, use 1 tablet 5 to 10 minutes before the activity. Patient Instructions:  Continue current medications as prescribed. Always keep a current medication list. Bring your medications to every office visit. Continue to follow up with primary care provider for non cardiac medical problems. Call the office with any problems, questions or concerns at [phone]. If you have been asked to keep a blood pressure log, do so for 2 weeks. Call the office to report readings to the triage nurse at [phone]. Follow up with cardiologist as scheduled. The following educational material has been included in this after visit summary for your review: Life simple 7. Heart health. Life simple 7  1) Manage blood pressure - high blood pressure is a major risk factor for heart disease and stroke. Keeping blood pressure in health range reduces strain on your heart, arteries and kidneys. Blood pressure goal is less than 130/80. 2) Control cholesterol - contributes to plaque, which can clog arteries and lead to heart disease and stroke.  When you control your cholesterol you are giving your arteries their best chance to remain clear. It is recommended that you get cholesterol lab work done once a year. 3) Reduce blood sugar - most of the food we eat is turning into glucose or blood sugar that our body uses for energy. Over time, high levels of blood sugar can damage your heart, kidneys, eyes and nerves. 4) Get active - living an active life is one of the most rewarding gifts you can give yourself and those you love. Simply put, daily physical activity increases your length and quality of life. Strive to exercise 15 minutes most days of the week. 5)  Eat better - A healthy diet is one of your best weapons for fighting cardiovascular disease. When you eat a heart healthy diet, you improve your chances for feeling good and staying healthy for life. 6)  Lose weight - when you shed extra fat an unnecessary pounds, you reduce the burden on your hear, lungs, blood vessels and skeleton. You give yourself the gift of active living, you lower your blood pressure and help yourself feel better. 7) Stop smoking - cigarette smokers have a higher risk of developing cardiovascular disease. If  You smoke, quitting is the best thing you can do for your health. Check American Heart Association on line for more information on Life's Simple 7 and tips for healthy living. A Healthy Heart: Care Instructions  Your Care Instructions     Coronary artery disease, also called heart disease, occurs when a substance called plaque builds up in the vessels that supply oxygen-rich blood to your heart muscle. This can narrow the blood vessels and reduce blood flow. A heart attack happens when blood flow is completely blocked. A high-fat diet, smoking, and other factors increase the risk of heart disease. Your doctor has found that you have a chance of having heart disease. You can do lots of things to keep your heart healthy.  It may not be easy, but you can change your diet, exercise more, and quit smoking. These steps really work to lower your chance of heart disease. Follow-up care is a key part of your treatment and safety. Be sure to make and go to all appointments, and call your doctor if you are having problems. It's also a good idea to know your test results and keep a list of the medicines you take. How can you care for yourself at home? Diet  · Use less salt when you cook and eat. This helps lower your blood pressure. Taste food before salting. Add only a little salt when you think you need it. With time, your taste buds will adjust to less salt. · Eat fewer snack items, fast foods, canned soups, and other high-salt, high-fat, processed foods. · Read food labels and try to avoid saturated and trans fats. They increase your risk of heart disease by raising cholesterol levels. · Limit the amount of solid fat-butter, margarine, and shortening-you eat. Use olive, peanut, or canola oil when you cook. Bake, broil, and steam foods instead of frying them. · Eat a variety of fruit and vegetables every day. Dark green, deep orange, red, or yellow fruits and vegetables are especially good for you. Examples include spinach, carrots, peaches, and berries. · Foods high in fiber can reduce your cholesterol and provide important vitamins and minerals. High-fiber foods include whole-grain cereals and breads, oatmeal, beans, brown rice, citrus fruits, and apples. · Eat lean proteins. Heart-healthy proteins include seafood, lean meats and poultry, eggs, beans, peas, nuts, seeds, and soy products. · Limit drinks and foods with added sugar. These include candy, desserts, and soda pop. Lifestyle changes  · If your doctor recommends it, get more exercise. Walking is a good choice. Bit by bit, increase the amount you walk every day. Try for at least 30 minutes on most days of the week. You also may want to swim, bike, or do other activities. · Do not smoke.  If you need help quitting, talk to your doctor about stop-smoking programs and medicines. These can increase your chances of quitting for good. Quitting smoking may be the most important step you can take to protect your heart. It is never too late to quit. · Limit alcohol to 2 drinks a day for men and 1 drink a day for women. Too much alcohol can cause health problems. · Manage other health problems such as diabetes, high blood pressure, and high cholesterol. If you think you may have a problem with alcohol or drug use, talk to your doctor. Medicines  · Take your medicines exactly as prescribed. Call your doctor if you think you are having a problem with your medicine. · If your doctor recommends aspirin, take the amount directed each day. Make sure you take aspirin and not another kind of pain reliever, such as acetaminophen (Tylenol). When should you call for help? [id number] if you have symptoms of a heart attack. These may include:  · Chest pain or pressure, or a strange feeling in the chest.  · Sweating. · Shortness of breath. · Pain, pressure, or a strange feeling in the back, neck, jaw, or upper belly or in one or both shoulders or arms. · Lightheadedness or sudden weakness. · A fast or irregular heartbeat. After you call 911, the  may tell you to chew 1 adult-strength or 2 to 4 low-dose aspirin. Wait for an ambulance. Do not try to drive yourself. Watch closely for changes in your health, and be sure to contact your doctor if you have any problems. Where can you learn more? Go to https://[employer]. org and sign in to your [employer] account. Enter H289 in the [employer] box to learn more about \"A Healthy Heart: Care Instructions. \"     If you do not have an account, please click on the \"Sign Up Now\" link. Current as of: December 16, 2019               Content Version: 12.5  © 6047-3797 Healthwise, Incorporated. Care instructions adapted under license by [facility] ([facility]).  If you have questions about a medical condition or this instruction, always ask your healthcare professional. [place] [address] any warranty or liability for your use of this information.

## 2022-05-24 ENCOUNTER — OFFICE VISIT (OUTPATIENT)
Dept: CARDIOLOGY CLINIC | Age: 77
End: 2022-05-24
Payer: MEDICARE

## 2022-05-24 VITALS
DIASTOLIC BLOOD PRESSURE: 74 MMHG | WEIGHT: 182 LBS | HEIGHT: 69 IN | BODY MASS INDEX: 26.96 KG/M2 | HEART RATE: 58 BPM | SYSTOLIC BLOOD PRESSURE: 116 MMHG

## 2022-05-24 DIAGNOSIS — I25.10 CORONARY ARTERY DISEASE INVOLVING NATIVE CORONARY ARTERY OF NATIVE HEART WITHOUT ANGINA PECTORIS: Primary | ICD-10-CM

## 2022-05-24 DIAGNOSIS — I25.2 HISTORY OF MYOCARDIAL INFARCTION: ICD-10-CM

## 2022-05-24 PROCEDURE — 93000 ELECTROCARDIOGRAM COMPLETE: CPT | Performed by: INTERNAL MEDICINE

## 2022-05-24 PROCEDURE — 1123F ACP DISCUSS/DSCN MKR DOCD: CPT | Performed by: INTERNAL MEDICINE

## 2022-05-24 PROCEDURE — 1036F TOBACCO NON-USER: CPT | Performed by: INTERNAL MEDICINE

## 2022-05-24 PROCEDURE — 99214 OFFICE O/P EST MOD 30 MIN: CPT | Performed by: INTERNAL MEDICINE

## 2022-05-24 PROCEDURE — G8427 DOCREV CUR MEDS BY ELIG CLIN: HCPCS | Performed by: INTERNAL MEDICINE

## 2022-05-24 PROCEDURE — G8417 CALC BMI ABV UP PARAM F/U: HCPCS | Performed by: INTERNAL MEDICINE

## 2022-05-24 RX ORDER — MULTIVIT WITH MINERALS/LUTEIN
250 TABLET ORAL DAILY
COMMUNITY
End: 2022-08-24

## 2022-05-24 RX ORDER — FINASTERIDE 5 MG/1
5 TABLET, FILM COATED ORAL DAILY
COMMUNITY

## 2022-05-24 RX ORDER — VITAMIN B COMPLEX
TABLET ORAL DAILY
COMMUNITY

## 2022-05-24 RX ORDER — LANOLIN ALCOHOL/MO/W.PET/CERES
325 CREAM (GRAM) TOPICAL
COMMUNITY

## 2022-05-24 RX ORDER — TADALAFIL 20 MG/1
20 TABLET ORAL PRN
Status: ON HOLD | COMMUNITY
End: 2022-08-24 | Stop reason: HOSPADM

## 2022-05-24 NOTE — PROGRESS NOTES
HISTORY  65-year-old gentleman with a history of dyslipidemia, hypertension, past tobacco abuse, and coronary disease returns for routine follow-up visit. 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 His ejection fraction was 45% at that time. 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 A positive stress test in February 2018 revealed his LAD stents to be patent but demonstrated tight distal right disease prompting additional stent placement. He underwent Lexiscan dual-isotope testing in July 2001 which was interpreted as showing a large fixed inferior apical defect with no ischemia. An echocardiogram obtained at that time however revealed relatively overall preserved systolic function EF 50 to 55% with distal apical hypokinesis. On return today he relates no cardiac symptoms, denying change in exercise tolerance, significant shortness of breath, and chest discomfort. His lipids were last checked in September with an LDL of 72, HDL 47, and triglycerides of 123. He has been vaccinated and boosted for COVID-19. PHYSICAL EXAM  On exam he carries 182 pounds on a 5 feet 9 inches frame. Pressure is 116/74 with pulse of 58. EOMs full, sclerae and conjunctiva normal. PERRLA. Mask in place. Trachea midline with no neck masses. Assessment of internal jugular veins reveals no elevation of central venous pressure at 45 degrees. Carotid pulses normal without delay or bruit. Thyroid normal to palpation. Chest exam reveals normal respiratory effort, no abnormal breath sounds and normal expiratory phase. No skin lesions seen. PMI normal. S1, S2 normal without murmur or hemalatha or click. Normal bowel sounds without palpable mass or bruit. No clubbing or acrocyanosis. No significant lower extremity edema or signs of venous insufficiency.   General motor strength appears to be within normal limits. Normal range of motion with normal gait. Alert, oriented x 3, memory and cognition normal as reflected by history and conversation. EKG reveals sinus rhythm with a right bundle branch block, inferior and pretransitional and lateral Q waves consistent with previous inferolateralapical MI.    ASSESSMENT/PLAN:   1. Coronary disease -clinically stable though suspect anginal warning system. Lexiscan dual-isotope from July reveals no evidence of ischemia. Continue metoprolol aspirin and nitroglycerin as needed  2. Hypertension -reasonable control. Continue metoprolol, lisinopril, and hydrochlorothiazide. 3.  Dyslipidemia -good control continue Lipitor  4. Pandemic response -appropriate.   Vaccinated/boosted

## 2022-06-21 NOTE — PROGRESS NOTES
Subjective    Mr. Shahid is 76 y.o. male    Chief Complaint: Erectile dysfunction    History of Present Illness    76-year-old male established patient followup for for erectile dysfunction and enlarged prostate.   His LUTS are well controlled on alfuzosin and finasteride.  He is mostly using tadalafil for his ED.   Associated symptoms he denies hematuria, dysuria, or flank pain.  UA today is clear    The following portions of the patient's history were reviewed and updated as appropriate: allergies, current medications, past family history, past medical history, past social history, past surgical history and problem list.    Review of Systems      Current Outpatient Medications:   •  alfuzosin (UROXATRAL) 10 MG 24 hr tablet, Take 1 tablet by mouth Every Night., Disp: 90 tablet, Rfl: 3  •  ASPIRIN 81 PO, Aspir-81  daily, Disp: , Rfl:   •  atorvastatin (LIPITOR) 40 MG tablet, , Disp: , Rfl:   •  finasteride (PROSCAR) 5 MG tablet, Take 1 tablet by mouth Daily., Disp: 90 tablet, Rfl: 3  •  hydroCHLOROthiazide (MICROZIDE) 12.5 MG capsule, hydrochlorothiazide 12.5 mg capsule  TAKE 1 CAPSULE BY MOUTH  EVERY DAY, Disp: , Rfl:   •  lisinopril (PRINIVIL,ZESTRIL) 2.5 MG tablet, Take 2.5 mg by mouth., Disp: , Rfl:   •  Metoprolol-HCTZ ER 25-12.5 MG tablet sustained-release 24 hour, metoprolol succ 25 mg-hydrochlorothiazide 12.5 mg tablet,ext.rel 24 hr  Take 1 tablet every day by oral route., Disp: , Rfl:   •  tadalafil (Cialis) 20 MG tablet, Take 1 tablet by mouth As Needed for Erectile Dysfunction., Disp: 30 tablet, Rfl: 11    Past Medical History:   Diagnosis Date   • Hypertension        No past surgical history on file.    Social History     Socioeconomic History   • Marital status: Unknown   Tobacco Use   • Smoking status: Never Smoker   • Smokeless tobacco: Never Used   Substance and Sexual Activity   • Alcohol use: Yes     Comment: social   • Drug use: Never       Family History   Problem Relation Age of Onset   • No  "Known Problems Father    • No Known Problems Mother        Objective    Temp 97.3 °F (36.3 °C)   Ht 177.8 cm (70\")   Wt 81.6 kg (179 lb 12.8 oz)   BMI 25.80 kg/m²     Physical Exam        Results for orders placed or performed in visit on 06/24/22   POC Urinalysis Dipstick, Multipro    Specimen: Urine   Result Value Ref Range    Color Yellow Yellow, Straw, Dark Yellow, [place]    Clarity, UA Clear Clear    Glucose, UA Negative Negative mg/dL    Bilirubin Negative Negative    Ketones, UA Negative Negative    Specific Gravity  1.020 1.005 - 1.030    Blood, UA Negative Negative    pH, Urine 5.0 5.0 - 8.0    Protein, POC Negative Negative mg/dL    Urobilinogen, UA Normal Normal    Nitrite, UA Negative Negative    Leukocytes       Assessment and Plan    Diagnoses and all orders for this visit:    1. Erectile dysfunction, unspecified erectile dysfunction type (Primary)  -     POC Urinalysis Dipstick, Multipro    2. Benign prostatic hyperplasia (BPH) with straining on urination  -     alfuzosin (UROXATRAL) 10 MG 24 hr tablet; Take 1 tablet by mouth Every Night.  Dispense: 90 tablet; Refill: 3  -     finasteride (PROSCAR) 5 MG tablet; Take 1 tablet by mouth Daily.  Dispense: 90 tablet; Refill: 3    3. Erectile dysfunction due to diseases classified elsewhere  -     Discontinue: tadalafil (Cialis) 20 MG tablet; Take 1 tablet by mouth As Needed for Erectile Dysfunction.  Dispense: 30 tablet; Refill: 11  -     tadalafil (Cialis) 20 MG tablet; Take 1 tablet by mouth As Needed for Erectile Dysfunction.  Dispense: 30 tablet; Refill: 11      Being well on combination therapy.  Continue alfuzosin and finasteride.    Continue tadalafil for CAD.  Follow-up in 1 year or sooner as needed.       This document has been signed by MARIBEL Persaud MD on June 25, 2022 20:56 CDT              "

## 2022-06-24 ENCOUNTER — OFFICE VISIT (OUTPATIENT)
Dept: UROLOGY | Facility: CLINIC | Age: 77
End: 2022-06-24

## 2022-06-24 VITALS — HEIGHT: 70 IN | WEIGHT: 179.8 LBS | TEMPERATURE: 97.3 F | BODY MASS INDEX: 25.74 KG/M2

## 2022-06-24 DIAGNOSIS — N52.9 ERECTILE DYSFUNCTION, UNSPECIFIED ERECTILE DYSFUNCTION TYPE: Primary | ICD-10-CM

## 2022-06-24 DIAGNOSIS — N40.1 BENIGN PROSTATIC HYPERPLASIA (BPH) WITH STRAINING ON URINATION: ICD-10-CM

## 2022-06-24 DIAGNOSIS — N52.1 ERECTILE DYSFUNCTION DUE TO DISEASES CLASSIFIED ELSEWHERE: ICD-10-CM

## 2022-06-24 DIAGNOSIS — R39.16 BENIGN PROSTATIC HYPERPLASIA (BPH) WITH STRAINING ON URINATION: ICD-10-CM

## 2022-06-24 LAB
BILIRUB BLD-MCNC: NEGATIVE MG/DL
CLARITY, POC: CLEAR
COLOR UR: YELLOW
GLUCOSE UR STRIP-MCNC: NEGATIVE MG/DL
KETONES UR QL: NEGATIVE
LEUKOCYTE EST, POC: NORMAL
NITRITE UR-MCNC: NEGATIVE MG/ML
PH UR: 5 [PH] (ref 5–8)
PROT UR STRIP-MCNC: NEGATIVE MG/DL
RBC # UR STRIP: NEGATIVE /UL
SP GR UR: 1.02 (ref 1–1.03)
UROBILINOGEN UR QL: NORMAL

## 2022-06-24 PROCEDURE — 99214 OFFICE O/P EST MOD 30 MIN: CPT | Performed by: UROLOGY

## 2022-06-24 PROCEDURE — 81001 URINALYSIS AUTO W/SCOPE: CPT | Performed by: UROLOGY

## 2022-06-24 RX ORDER — FINASTERIDE 5 MG/1
5 TABLET, FILM COATED ORAL DAILY
Qty: 90 TABLET | Refills: 3 | Status: SHIPPED | OUTPATIENT
Start: 2022-06-24

## 2022-06-24 RX ORDER — ALFUZOSIN HYDROCHLORIDE 10 MG/1
10 TABLET, EXTENDED RELEASE ORAL NIGHTLY
Qty: 90 TABLET | Refills: 3 | Status: SHIPPED | OUTPATIENT
Start: 2022-06-24

## 2022-06-24 RX ORDER — TADALAFIL 20 MG/1
20 TABLET ORAL AS NEEDED
Qty: 30 TABLET | Refills: 11 | Status: SHIPPED | OUTPATIENT
Start: 2022-06-24 | End: 2022-06-25

## 2022-06-25 RX ORDER — TADALAFIL 20 MG/1
20 TABLET ORAL AS NEEDED
Qty: 30 TABLET | Refills: 11 | OUTPATIENT
Start: 2022-06-25 | End: 2022-08-01 | Stop reason: SDUPTHER

## 2022-08-01 ENCOUNTER — TELEPHONE (OUTPATIENT)
Dept: UROLOGY | Facility: CLINIC | Age: 77
End: 2022-08-01

## 2022-08-01 DIAGNOSIS — N52.1 ERECTILE DYSFUNCTION DUE TO DISEASES CLASSIFIED ELSEWHERE: ICD-10-CM

## 2022-08-01 RX ORDER — TADALAFIL 20 MG/1
20 TABLET ORAL AS NEEDED
Qty: 30 TABLET | Refills: 11 | Status: SHIPPED | OUTPATIENT
Start: 2022-08-01 | End: 2022-08-15 | Stop reason: SDUPTHER

## 2022-08-01 NOTE — TELEPHONE ENCOUNTER
tadalafil (Cialis) 20 MG tablet    Patient said prescription didn't make it to his pharmacy but he doesn't want it to go to [facility] pharmacy. He wants it to go to [facility].

## 2022-08-10 ENCOUNTER — TELEPHONE (OUTPATIENT)
Dept: CARDIOLOGY CLINIC | Age: 77
End: 2022-08-10

## 2022-08-10 NOTE — TELEPHONE ENCOUNTER
Pt wife called stating pt is having SOA and dizziness. Pt BP is fine. Pt wanted to be seen. Made an apt on 8-15-22 with Reather Pipe.

## 2022-08-15 ENCOUNTER — OFFICE VISIT (OUTPATIENT)
Dept: CARDIOLOGY CLINIC | Age: 77
End: 2022-08-15
Payer: MEDICARE

## 2022-08-15 ENCOUNTER — TELEPHONE (OUTPATIENT)
Dept: CARDIOLOGY CLINIC | Age: 77
End: 2022-08-15

## 2022-08-15 VITALS
BODY MASS INDEX: 26.96 KG/M2 | SYSTOLIC BLOOD PRESSURE: 134 MMHG | HEART RATE: 49 BPM | DIASTOLIC BLOOD PRESSURE: 62 MMHG | HEIGHT: 69 IN | WEIGHT: 182 LBS

## 2022-08-15 DIAGNOSIS — N52.1 ERECTILE DYSFUNCTION DUE TO DISEASES CLASSIFIED ELSEWHERE: ICD-10-CM

## 2022-08-15 DIAGNOSIS — R00.1 BRADYCARDIA: ICD-10-CM

## 2022-08-15 DIAGNOSIS — I10 ESSENTIAL HYPERTENSION: ICD-10-CM

## 2022-08-15 DIAGNOSIS — R55 SYNCOPE, UNSPECIFIED SYNCOPE TYPE: ICD-10-CM

## 2022-08-15 DIAGNOSIS — I25.10 CORONARY ARTERY DISEASE INVOLVING NATIVE CORONARY ARTERY OF NATIVE HEART WITHOUT ANGINA PECTORIS: Primary | ICD-10-CM

## 2022-08-15 DIAGNOSIS — E78.2 MIXED HYPERLIPIDEMIA: ICD-10-CM

## 2022-08-15 LAB
ALBUMIN SERPL-MCNC: 4.5 G/DL (ref 3.5–5.2)
ALP BLD-CCNC: 66 U/L (ref 40–130)
ALT SERPL-CCNC: 24 U/L (ref 5–41)
ANION GAP SERPL CALCULATED.3IONS-SCNC: 10 MMOL/L (ref 7–19)
AST SERPL-CCNC: 23 U/L (ref 5–40)
BILIRUB SERPL-MCNC: 0.5 MG/DL (ref 0.2–1.2)
BUN BLDV-MCNC: 19 MG/DL (ref 8–23)
CALCIUM SERPL-MCNC: 9.6 MG/DL (ref 8.8–10.2)
CHLORIDE BLD-SCNC: 105 MMOL/L (ref 98–111)
CO2: 25 MMOL/L (ref 22–29)
CREAT SERPL-MCNC: 1.3 MG/DL (ref 0.5–1.2)
GFR AFRICAN AMERICAN: >59
GFR NON-AFRICAN AMERICAN: 53
GLUCOSE BLD-MCNC: 101 MG/DL (ref 74–109)
MAGNESIUM: 2.3 MG/DL (ref 1.6–2.4)
POTASSIUM SERPL-SCNC: 5.3 MMOL/L (ref 3.5–5)
SODIUM BLD-SCNC: 140 MMOL/L (ref 136–145)
TOTAL PROTEIN: 6.7 G/DL (ref 6.6–8.7)

## 2022-08-15 PROCEDURE — 1123F ACP DISCUSS/DSCN MKR DOCD: CPT | Performed by: CLINICAL NURSE SPECIALIST

## 2022-08-15 PROCEDURE — 1036F TOBACCO NON-USER: CPT | Performed by: CLINICAL NURSE SPECIALIST

## 2022-08-15 PROCEDURE — G8427 DOCREV CUR MEDS BY ELIG CLIN: HCPCS | Performed by: CLINICAL NURSE SPECIALIST

## 2022-08-15 PROCEDURE — 99214 OFFICE O/P EST MOD 30 MIN: CPT | Performed by: CLINICAL NURSE SPECIALIST

## 2022-08-15 PROCEDURE — 93242 EXT ECG>48HR<7D RECORDING: CPT | Performed by: NURSE PRACTITIONER

## 2022-08-15 PROCEDURE — G8417 CALC BMI ABV UP PARAM F/U: HCPCS | Performed by: CLINICAL NURSE SPECIALIST

## 2022-08-15 RX ORDER — TADALAFIL 20 MG/1
20 TABLET ORAL AS NEEDED
Qty: 30 TABLET | Refills: 11 | Status: SHIPPED | OUTPATIENT
Start: 2022-08-15 | End: 2022-08-15 | Stop reason: SDUPTHER

## 2022-08-15 RX ORDER — TADALAFIL 20 MG/1
20 TABLET ORAL AS NEEDED
Qty: 30 TABLET | Refills: 11 | Status: SHIPPED | OUTPATIENT
Start: 2022-08-15

## 2022-08-15 NOTE — PROGRESS NOTES
[facility]  [address] [place] [address] [address] [place] [address]  Phone: [phone]  Fax: [phone]    OFFICE VISIT:  8/15/2022    [place] - : [id number]    Reason For Visit:  Larissa Marr is a 67 y.o. male who is here for Follow-up (Pt has soa  flucuating blood pressure and low pulse) and Coronary Artery Disease  History dyslipidemia, hypertension, past tobacco abuse, coronary disease with apical infarct in . Stents placed to LAD with EF 45%. 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 Heart cath 2018 showed patent LAD stents with additional stenting to right.  2021 nuclear stress test showed large fixed inferolateral apical defect with no new ischemia. EF 58%  2D echo showed normal LV size with distal apical hypokinesis with EF preserved at 55%. Grade 2 diastolic dysfunction. No significant valvular disease noted. Patient was seen for routine follow-up in May with Dr. Catina Barnard with no change in status    He started having more MONGE and fatigue and dizziness the last few weeks. Could not finish his golf game 1 day last week  At home had a seizure like activity cording to his wife. - he was on his stairs on back doing his normal back stretching when his  wife heard him kicking - he was \"unconscious\" for a few seconds with leg kicking on floor. When he came to he had no confusion. Did not know why he was sitting on the floor. No loss of bowel or bladder function. Wife called his primary care provider and was seen. Outpatient testing done at local hospital.  EKG, carotids and MRI ordered. Has neurology consult tomorrow    They report his blood pressure has been up and down at home. Lisinopril was increased to 5 mg for better blood pressure control but they went back to 2.5 mg as they thought it was contributing.   Blood pressure at home running 120-150 ,Pulse running 30-50s      EKG received from outlying facility showing sinus rhythm with first-degree AV block with old anterior infarct. Interventricular conduction delay. With 2 noted nonconducted P waves  Carotids and MRI unremarkable    Subjective  Fauzia Baptiste denies exertional chest pain, resting shortness of breath, orthopnea, paroxysmal nocturnal dyspnea,  arrhythmia, edema and fatigue. The patient denies numbness or weakness to suggest cerebrovascular accident or transient ischemic attack. CARLOS MANUEL Aceves CNP is PCP.   Most recent labs were in May  [facility] has the following history as recorded in [facility]:    Patient Active Problem List    Diagnosis Date Noted    Bradycardia 07/02/2019    Essential hypertension 01/23/2019    History of placement of stent in LAD coronary artery 01/23/2019    Hyperlipidemia     MI ([facility]ocardial infarction) ([facility] [address]     Left ventricular dysfunction     CAD (coronary artery disease) 02/29/2016    ED (erectile dysfunction) 02/29/2016     Past Medical History:   Diagnosis Date    Brewer esophagus     Dr Gale Dsailva    CAD (coronary artery disease) 2/29/2016 2/29/16  Acute inferior ST segment elevation MI, received TNKase at [facility], AUC indication 2, AUC score 9, [facility] [id number] 2/29/16  Cath 90% mid LAD 2.25 x 15 and 2.25 x 18 KAVITA, o/w luminals, apical dyskinesis, EF 45%    Cancer (HCC)     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    ED (erectile dysfunction) 2/29/2016    GERD (gastroesophageal reflux disease)     Hyperlipidemia     Kidney disease stage 3     Left ventricular dysfunction     MI (myocardial infarction) ([address]      Past Surgical History:   Procedure Laterality Date    CARDIAC CATHETERIZATION      CARDIAC CATHETERIZATION      COLONOSCOPY      CORONARY ANGIOPLASTY WITH STENT PLACEMENT      HAND SURGERY      SKIN BIOPSY       Family History   Problem Relation Age of Onset    Heart Disease Mother      Social History     Tobacco Use    Smoking status: Former     Types: Cigarettes    Smokeless tobacco: Never   Substance Use Topics    Alcohol use: Yes     Alcohol/week: 0.0 standard drinks      Current Outpatient Medications   Medication Sig Dispense Refill    finasteride (PROSCAR) 5 MG tablet Take 5 mg by mouth daily      Coenzyme Q10 (COQ10) 100 MG CAPS Take by mouth daily      Cimetidine (HEARTBURN RELIEF PO) Take by mouth as needed      CALCIUM-MAGNESIUM-ZINC PO Take by mouth daily      Ascorbic Acid (VITAMIN C) 250 MG tablet Take 250 mg by mouth daily      ferrous sulfate (FE TABS 325) 325 (65 Fe) MG EC tablet Take 325 mg by mouth daily (with breakfast)      tadalafil (CIALIS) 20 MG tablet Take 20 mg by mouth as needed for Erectile Dysfunction      COVID-19 mRNA Virus Vaccine (PFIZER-BIONTECH COVID-19 VACC IM) Inject into the muscle X3      lisinopril (PRINIVIL;ZESTRIL) 2.5 MG tablet Take 1 tablet by mouth daily 90 tablet 3    hydrochlorothiazide (HYDRODIURIL) 12.5 MG tablet Take 1 tablet by mouth daily 90 tablet 3    atorvastatin (LIPITOR) 40 MG tablet Take 1 tablet by mouth daily 90 tablet 0    nitroGLYCERIN (NITROSTAT) 0.4 MG SL tablet Place 1 tablet under the tongue every 5 minutes as needed for Chest pain 25 tablet 3    loratadine (CLARITIN) 10 MG capsule Take 10 mg by mouth as needed      diphenhydrAMINE-APAP, sleep, (TYLENOL PM EXTRA STRENGTH)  MG tablet Take 1 tablet by mouth nightly      Multiple Vitamins-Minerals (THERAPEUTIC MULTIVITAMIN-MINERALS) tablet Take 1 tablet by mouth daily      alfuzosin (UROXATRAL) 10 MG extended release tablet Take 10 mg by mouth daily      aspirin 81 MG tablet Take 81 mg by mouth daily      vitamin D (CHOLECALCIFEROL) 125 MCG (5000 UT) CAPS capsule Take 5,000 Units by mouth daily (Patient not taking: Reported on 8/15/2022)       No current facility-administered medications for this visit. Allergies: Patient has no known allergies. Review of Systems  Constitutional - + significant activity change, no appetite change, or unexpected weight change. No fever, chills or diaphoresis. + fatigue.    HEENT - no rhythm with ZIO monitor. Due to history of coronary disease and previous and accurate warning mechanisms we will get him set up for a nuclear stress test as well to evaluate for any myocardial ischemia    No recent labs. We will check electrolytes today  Will call with results and see him back after his testing is complete. Discussed if he has any recurrent episodes to go to the emergency room. Do not drive or operate any heavy machinery if feeling dizzy or has a recurrent episode. 30 minutes were spent preparing, reviewing and seeing patient. All questions answered    Plan    Labs today  Wear monitor for a week  Stop the Toprol for now   Nuclear stress test soon   Follow up in 3-4 weeks   Call with any questions or concerns  Follow up with CARLOS MANUEL Negro - CNP for non cardiac problems  Report any new problems  Cardiovascular Fitness-Exercise as tolerated. Strive for 30 minutes of exercise most days of the week. Cardiac / Healthy Diet  Continue current medications as directed  Continue plan of treatment  It is always recommended that you bring your medications bottles with you to each visit - this is for your safety! CARLOS MANUEL Gutierrez    EMR dragon/transcription disclaimer: Much of this encounter note is electronic transcription/translation of spoken language to printed tach. Electronic translation of spoken language may be erroneous, or at times, nonsensical words or phrases may be inadvertently transcribed.  Although, I have reviewed the note for such errors, some may still exist.

## 2022-08-15 NOTE — PATIENT INSTRUCTIONS
Labs today  Wear monitor for a week  Stop the Toprol for now   Nuclear stress test soon   Follow up in 3-4 weeks   Call with any questions or concerns  Follow up with CARLOS MANUEL Doran CNP for non cardiac problems  Report any new problems  Cardiovascular Fitness-Exercise as tolerated. Strive for 30 minutes of exercise most days of the week. Cardiac / Healthy Diet  Continue current medications as directed  Continue plan of treatment  It is always recommended that you bring your medications bottles with you to each visit - this is for your safety! [place] at the [facility] and [address] [facility] [place] located on the first floor of [place] [address] through hospital main entrance and turn immediately to your left. Patient's contact number:  [phone] (home)      Lexiscan Stress Test      Lexiscan (regadenoson injection) is a prescription drug given through an IV line that increases blood flow through the arteries of the heart during a cardiac nuclear stress test.     There are two parts to a Lexiscan stress test: the rest portion and the exercise portion. For the rest portion, a radioactive tracer is injected into your arm through the IV. After 30 to 60 minutes, the process of imaging will begin. A nuclear camera will be placed on your chest area and images are taken for the next 15 to 20 minutes. For the exercise portion, a nurse will attach EKG electrodes to your chest to monitor your heart rate. The drug Damian Sergio is administered to simulate stress on the heart. Your heart rhythm will then be monitored for the next few minutes. Your blood pressure will also be monitored throughout the exercise portion. Williamson through the exercise portion, a second round of radioactive tracer is injected into your body. Your heart rate and EKG will be monitored for another few minutes after administering the drug. Test Preparation:    Bring a list of your current medications.   Do not

## 2022-08-15 NOTE — TELEPHONE ENCOUNTER
----- Message from CARLOS MANUEL Wilder sent at 8/15/2022 12:46 PM CDT -----  Please let him know his magnesium levels were normal.  His potassium level is a little elevated at 5.3. Kidney function stable.   He needs to avoid potassium rich foods such as bananas, potatoes, and any over-the-counter supplements that have potassium including a multivitamin    If his blood pressure elevates as I have stopped his Toprol he can increase his HCTZ to 25 mg daily  Will let him know what his monitor shows          Called to give results, the patient was driving and cell service was not good  will try to reach him later

## 2022-08-23 ENCOUNTER — HOSPITAL ENCOUNTER (INPATIENT)
Age: 77
LOS: 1 days | Discharge: HOME | DRG: 229 | End: 2022-08-24
Attending: EMERGENCY MEDICINE | Admitting: INTERNAL MEDICINE
Payer: MEDICARE

## 2022-08-23 ENCOUNTER — HOSPITAL ENCOUNTER (OUTPATIENT)
Dept: NUCLEAR MEDICINE | Age: 77
Discharge: HOME | DRG: 229 | End: 2022-08-25
Payer: MEDICARE

## 2022-08-23 ENCOUNTER — APPOINTMENT (OUTPATIENT)
Dept: GENERAL RADIOLOGY | Age: 77
DRG: 229 | End: 2022-08-23
Payer: MEDICARE

## 2022-08-23 DIAGNOSIS — R55 SYNCOPE, UNSPECIFIED SYNCOPE TYPE: ICD-10-CM

## 2022-08-23 DIAGNOSIS — R00.1 SYMPTOMATIC BRADYCARDIA: Primary | ICD-10-CM

## 2022-08-23 DIAGNOSIS — I25.10 CORONARY ARTERY DISEASE INVOLVING NATIVE CORONARY ARTERY OF NATIVE HEART WITHOUT ANGINA PECTORIS: ICD-10-CM

## 2022-08-23 LAB
ALBUMIN SERPL-MCNC: 4.3 G/DL (ref 3.5–5.2)
ALP BLD-CCNC: 58 U/L (ref 40–130)
ALT SERPL-CCNC: 16 U/L (ref 5–41)
ANION GAP SERPL CALCULATED.3IONS-SCNC: 10 MMOL/L (ref 7–19)
AST SERPL-CCNC: 19 U/L (ref 5–40)
BASOPHILS ABSOLUTE: 0 K/UL (ref 0–0.2)
BASOPHILS RELATIVE PERCENT: 0.2 % (ref 0–1)
BILIRUB SERPL-MCNC: 0.6 MG/DL (ref 0.2–1.2)
BUN BLDV-MCNC: 21 MG/DL (ref 8–23)
CALCIUM SERPL-MCNC: 9.4 MG/DL (ref 8.8–10.2)
CHLORIDE BLD-SCNC: 102 MMOL/L (ref 98–111)
CO2: 23 MMOL/L (ref 22–29)
CREAT SERPL-MCNC: 1.6 MG/DL (ref 0.5–1.2)
EOSINOPHILS ABSOLUTE: 0.2 K/UL (ref 0–0.6)
EOSINOPHILS RELATIVE PERCENT: 3.8 % (ref 0–5)
GFR AFRICAN AMERICAN: 51
GFR NON-AFRICAN AMERICAN: 42
GLUCOSE BLD-MCNC: 122 MG/DL (ref 74–109)
HCT VFR BLD CALC: 41.1 % (ref 42–52)
HEMOGLOBIN: 13.5 G/DL (ref 14–18)
IMMATURE GRANULOCYTES #: 0 K/UL
LV EF: 58 %
LVEF MODALITY: NORMAL
LYMPHOCYTES ABSOLUTE: 1.9 K/UL (ref 1.1–4.5)
LYMPHOCYTES RELATIVE PERCENT: 30.7 % (ref 20–40)
MAGNESIUM: 2.3 MG/DL (ref 1.6–2.4)
MCH RBC QN AUTO: 32.4 PG (ref 27–31)
MCHC RBC AUTO-ENTMCNC: 32.8 G/DL (ref 33–37)
MCV RBC AUTO: 98.6 FL (ref 80–94)
MONOCYTES ABSOLUTE: 0.6 K/UL (ref 0–0.9)
MONOCYTES RELATIVE PERCENT: 9.6 % (ref 0–10)
NEUTROPHILS ABSOLUTE: 3.5 K/UL (ref 1.5–7.5)
NEUTROPHILS RELATIVE PERCENT: 55.2 % (ref 50–65)
PDW BLD-RTO: 11.7 % (ref 11.5–14.5)
PLATELET # BLD: 159 K/UL (ref 130–400)
PMV BLD AUTO: 10 FL (ref 9.4–12.4)
POTASSIUM SERPL-SCNC: 4.5 MMOL/L (ref 3.5–5)
PRO-BNP: 288 PG/ML (ref 0–1800)
RBC # BLD: 4.17 M/UL (ref 4.7–6.1)
SARS-COV-2, NAAT: NOT DETECTED
SODIUM BLD-SCNC: 135 MMOL/L (ref 136–145)
TOTAL PROTEIN: 6.9 G/DL (ref 6.6–8.7)
TROPONIN: <0.01 NG/ML (ref 0–0.03)
TSH SERPL DL<=0.05 MIU/L-ACNC: 2.97 UIU/ML (ref 0.27–4.2)
WBC # BLD: 6.3 K/UL (ref 4.8–10.8)

## 2022-08-23 PROCEDURE — 6370000000 HC RX 637 (ALT 250 FOR IP): Performed by: INTERNAL MEDICINE

## 2022-08-23 PROCEDURE — 84443 ASSAY THYROID STIM HORMONE: CPT

## 2022-08-23 PROCEDURE — 99223 1ST HOSP IP/OBS HIGH 75: CPT | Performed by: INTERNAL MEDICINE

## 2022-08-23 PROCEDURE — 36415 COLL VENOUS BLD VENIPUNCTURE: CPT

## 2022-08-23 PROCEDURE — 3430000000 HC RX DIAGNOSTIC RADIOPHARMACEUTICAL: Performed by: CLINICAL NURSE SPECIALIST

## 2022-08-23 PROCEDURE — 93005 ELECTROCARDIOGRAM TRACING: CPT | Performed by: EMERGENCY MEDICINE

## 2022-08-23 PROCEDURE — 71045 X-RAY EXAM CHEST 1 VIEW: CPT

## 2022-08-23 PROCEDURE — 84484 ASSAY OF TROPONIN QUANT: CPT

## 2022-08-23 PROCEDURE — A9502 TC99M TETROFOSMIN: HCPCS | Performed by: CLINICAL NURSE SPECIALIST

## 2022-08-23 PROCEDURE — 71045 X-RAY EXAM CHEST 1 VIEW: CPT | Performed by: RADIOLOGY

## 2022-08-23 PROCEDURE — 87635 SARS-COV-2 COVID-19 AMP PRB: CPT

## 2022-08-23 PROCEDURE — 6360000002 HC RX W HCPCS: Performed by: CLINICAL NURSE SPECIALIST

## 2022-08-23 PROCEDURE — 6360000004 HC RX CONTRAST MEDICATION: Performed by: INTERNAL MEDICINE

## 2022-08-23 PROCEDURE — 85025 COMPLETE CBC W/AUTO DIFF WBC: CPT

## 2022-08-23 PROCEDURE — 2580000003 HC RX 258: Performed by: INTERNAL MEDICINE

## 2022-08-23 PROCEDURE — 78452 HT MUSCLE IMAGE SPECT MULT: CPT

## 2022-08-23 PROCEDURE — 83735 ASSAY OF MAGNESIUM: CPT

## 2022-08-23 PROCEDURE — 99285 EMERGENCY DEPT VISIT HI MDM: CPT

## 2022-08-23 PROCEDURE — C8929 TTE W OR WO FOL WCON,DOPPLER: HCPCS

## 2022-08-23 PROCEDURE — 2100000000 HC CCU R&B

## 2022-08-23 PROCEDURE — 6360000002 HC RX W HCPCS: Performed by: INTERNAL MEDICINE

## 2022-08-23 PROCEDURE — 83880 ASSAY OF NATRIURETIC PEPTIDE: CPT

## 2022-08-23 PROCEDURE — 80053 COMPREHEN METABOLIC PANEL: CPT

## 2022-08-23 RX ORDER — ASPIRIN 81 MG/1
81 TABLET, CHEWABLE ORAL DAILY
Status: DISCONTINUED | OUTPATIENT
Start: 2022-08-23 | End: 2022-08-24 | Stop reason: HOSPADM

## 2022-08-23 RX ORDER — SODIUM CHLORIDE 0.9 % (FLUSH) 0.9 %
5-40 SYRINGE (ML) INJECTION EVERY 12 HOURS SCHEDULED
Status: DISCONTINUED | OUTPATIENT
Start: 2022-08-23 | End: 2022-08-24 | Stop reason: HOSPADM

## 2022-08-23 RX ORDER — ATORVASTATIN CALCIUM 40 MG/1
40 TABLET, FILM COATED ORAL NIGHTLY
Status: DISCONTINUED | OUTPATIENT
Start: 2022-08-23 | End: 2022-08-24 | Stop reason: HOSPADM

## 2022-08-23 RX ORDER — ENOXAPARIN SODIUM 100 MG/ML
40 INJECTION SUBCUTANEOUS EVERY 24 HOURS
Status: DISCONTINUED | OUTPATIENT
Start: 2022-08-23 | End: 2022-08-24 | Stop reason: HOSPADM

## 2022-08-23 RX ORDER — ACETAMINOPHEN 325 MG/1
650 TABLET ORAL EVERY 6 HOURS PRN
Status: DISCONTINUED | OUTPATIENT
Start: 2022-08-23 | End: 2022-08-24 | Stop reason: HOSPADM

## 2022-08-23 RX ORDER — DIPHENHYDRAMINE HCL 25 MG
25 TABLET ORAL NIGHTLY PRN
Status: DISCONTINUED | OUTPATIENT
Start: 2022-08-23 | End: 2022-08-24 | Stop reason: HOSPADM

## 2022-08-23 RX ORDER — ACETAMINOPHEN 500 MG
500 TABLET ORAL
Status: DISCONTINUED | OUTPATIENT
Start: 2022-08-23 | End: 2022-08-24 | Stop reason: HOSPADM

## 2022-08-23 RX ORDER — NITROGLYCERIN 0.4 MG/1
0.4 TABLET SUBLINGUAL EVERY 5 MIN PRN
Status: DISCONTINUED | OUTPATIENT
Start: 2022-08-23 | End: 2022-08-24 | Stop reason: HOSPADM

## 2022-08-23 RX ORDER — SODIUM CHLORIDE 0.9 % (FLUSH) 0.9 %
5-40 SYRINGE (ML) INJECTION PRN
Status: DISCONTINUED | OUTPATIENT
Start: 2022-08-23 | End: 2022-08-24 | Stop reason: HOSPADM

## 2022-08-23 RX ORDER — ACETAMINOPHEN 650 MG/1
650 SUPPOSITORY RECTAL EVERY 6 HOURS PRN
Status: DISCONTINUED | OUTPATIENT
Start: 2022-08-23 | End: 2022-08-24 | Stop reason: HOSPADM

## 2022-08-23 RX ORDER — LISINOPRIL 2.5 MG/1
2.5 TABLET ORAL DAILY
Status: DISCONTINUED | OUTPATIENT
Start: 2022-08-24 | End: 2022-08-24 | Stop reason: HOSPADM

## 2022-08-23 RX ORDER — FERROUS SULFATE 325(65) MG
325 TABLET ORAL
Status: DISCONTINUED | OUTPATIENT
Start: 2022-08-24 | End: 2022-08-24 | Stop reason: HOSPADM

## 2022-08-23 RX ORDER — SODIUM CHLORIDE 9 MG/ML
INJECTION, SOLUTION INTRAVENOUS PRN
Status: DISCONTINUED | OUTPATIENT
Start: 2022-08-23 | End: 2022-08-24 | Stop reason: HOSPADM

## 2022-08-23 RX ADMIN — SODIUM CHLORIDE, PRESERVATIVE FREE 10 ML: 5 INJECTION INTRAVENOUS at 21:34

## 2022-08-23 RX ADMIN — PERFLUTREN 1.5 ML: 6.52 INJECTION, SUSPENSION INTRAVENOUS at 13:57

## 2022-08-23 RX ADMIN — ATORVASTATIN CALCIUM 40 MG: 40 TABLET, FILM COATED ORAL at 21:34

## 2022-08-23 RX ADMIN — TETROFOSMIN 8 MILLICURIE: 1.38 INJECTION, POWDER, LYOPHILIZED, FOR SOLUTION INTRAVENOUS at 09:45

## 2022-08-23 RX ADMIN — ENOXAPARIN SODIUM 40 MG: 100 INJECTION SUBCUTANEOUS at 14:33

## 2022-08-23 RX ADMIN — ACETAMINOPHEN 500 MG: 500 TABLET ORAL at 21:34

## 2022-08-23 ASSESSMENT — ENCOUNTER SYMPTOMS
DIARRHEA: 0
VOMITING: 0
ABDOMINAL PAIN: 0
ABDOMINAL DISTENTION: 0
SHORTNESS OF BREATH: 0
COUGH: 0
NAUSEA: 0

## 2022-08-23 ASSESSMENT — PAIN - FUNCTIONAL ASSESSMENT
PAIN_FUNCTIONAL_ASSESSMENT: NONE - DENIES PAIN
PAIN_FUNCTIONAL_ASSESSMENT: NONE - DENIES PAIN

## 2022-08-23 NOTE — H&P
[address]    Patient: Mathew Puri   : 1945   MRN: [id number]  Code Status: Full Code  PCP: CARLOS MANUEL Umanzor CNP  Date of Service: 2022    Chief Complaint:   Bradycardia    History of Present Illness:   71-year-old male with past medical history as listed below presented to ER from nuclear med department with bradycardia. He was scheduled for an outpatient [facility] however this was canceled secondary to bradycardia. Patient states the bradycardia started a couple of weeks ago. His beta-blocker was discontinued at that time. He reports dizziness but denies chest pain or dyspnea. Denies any further complaints. Admitted to critical care unit with symptomatic bradycardia.     Review of Systems:   A comprehensive review of systems was negative except for: Cardiovascular: positive for bradycardia    Past Medical History:     Past Medical History:   Diagnosis Date    Brewer esophagus     Dr Lydia Vo    CAD (coronary artery disease) 2016  Acute inferior ST segment elevation MI, received TNKase at [facility], AUC indication 2, AUC score 9, [facility] 2012;[id number]  Cath 90% mid LAD 2.25 x 15 and 2.25 x 18 KAVITA, o/w luminals, apical dyskinesis, EF 45%    Cancer (HCC)     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    ED (erectile dysfunction) 2016    GERD (gastroesophageal reflux disease)     Hyperlipidemia     Kidney disease stage 3     Left ventricular dysfunction     MI (myocardial infarction) ([facility] [address]          Past Surgical History:     Past Surgical History:   Procedure Laterality Date    CARDIAC CATHETERIZATION      CARDIAC CATHETERIZATION      COLONOSCOPY      CORONARY ANGIOPLASTY WITH STENT PLACEMENT      HAND SURGERY      SKIN BIOPSY          Family History:     Family History   Problem Relation Age of Onset    Heart Disease Mother         Social History:     Social History     Socioeconomic History    Marital status:  Spouse name: None    Number of children: None    Years of education: None    Highest education level: None   Tobacco Use    Smoking status: Former     Types: Cigarettes    Smokeless tobacco: Never   Vaping Use    Vaping Use: Never used   Substance and Sexual Activity    Alcohol use: Yes     Comment: 1 beer/night    Drug use: No       Prior to Admission Medications:   Medications Prior to Admission: finasteride (PROSCAR) 5 MG tablet, Take 5 mg by mouth daily  Coenzyme Q10 (COQ10) 100 MG CAPS, Take by mouth daily  Cimetidine (HEARTBURN RELIEF PO), Take by mouth as needed  CALCIUM-MAGNESIUM-ZINC PO, Take by mouth daily  Ascorbic Acid (VITAMIN C) 250 MG tablet, Take 250 mg by mouth daily  vitamin D (CHOLECALCIFEROL) 125 MCG (5000 UT) CAPS capsule, Take 5,000 Units by mouth daily (Patient not taking: Reported on 8/15/2022)  ferrous sulfate (FE TABS 325) 325 (65 Fe) MG EC tablet, Take 325 mg by mouth daily (with breakfast)  tadalafil (CIALIS) 20 MG tablet, Take 20 mg by mouth as needed for Erectile Dysfunction  COVID-19 mRNA Virus Vaccine (PFIZER-BIONTECH COVID-19 VACC IM), Inject into the muscle X3  lisinopril (PRINIVIL;ZESTRIL) 2.5 MG tablet, Take 1 tablet by mouth daily  hydrochlorothiazide (HYDRODIURIL) 12.5 MG tablet, Take 1 tablet by mouth daily  atorvastatin (LIPITOR) 40 MG tablet, Take 1 tablet by mouth daily  nitroGLYCERIN (NITROSTAT) 0.4 MG SL tablet, Place 1 tablet under the tongue every 5 minutes as needed for Chest pain  loratadine (CLARITIN) 10 MG capsule, Take 10 mg by mouth as needed  diphenhydrAMINE-APAP, sleep, (TYLENOL PM EXTRA STRENGTH)  MG tablet, Take 1 tablet by mouth nightly  Multiple Vitamins-Minerals (THERAPEUTIC MULTIVITAMIN-MINERALS) tablet, Take 1 tablet by mouth daily  alfuzosin (UROXATRAL) 10 MG extended release tablet, Take 10 mg by mouth daily  aspirin 81 MG tablet, Take 81 mg by mouth daily     Allergies:   No Known Allergies      Physical Exam:   BP (!) 122/56   Pulse (!) 32 Temp 97.1 °F (36.2 °C)   Resp 20   Ht 5' 9\" (1.753 m)   Wt 176 lb (79.8 kg)   SpO2 97%   BMI 25.99 kg/m²     General: no acute distress  HEENT: normocephalic, atraumatic  Neck: supple, symmetrical, trachea midline   Lungs: clear to auscultation bilaterally   Cardiovascular: s1 and s2 normal, bradycardia  Abdomen: soft, positive bowel sounds  Extremities: no edema or cyanosis   Neuro: aaox3, no focal deficits   Skin: normal color and texture     Recent Results (from the past 72 hour(s))   COVID-19, Rapid    Collection Time: 08/23/22 10:10 AM    Specimen: Nasopharyngeal Swab   Result Value Ref Range    SARS-CoV-2, NAAT Not Detected Not Detected   Comprehensive Metabolic Panel    Collection Time: 08/23/22 10:15 AM   Result Value Ref Range    Sodium 135 (L) 136 - 145 mmol/L    Potassium 4.5 3.5 - 5.0 mmol/L    Chloride 102 98 - 111 mmol/L    CO2 23 22 - 29 mmol/L    Anion Gap 10 7 - 19 mmol/L    Glucose 122 (H) 74 - 109 mg/dL    BUN 21 8 - 23 mg/dL    Creatinine 1.6 (H) 0.5 - 1.2 mg/dL    GFR Non-African American 42 (A) >60    GFR  51 (L) >59    Calcium 9.4 8.8 - 10.2 mg/dL    Total Protein 6.9 6.6 - 8.7 g/dL    Albumin 4.3 3.5 - 5.2 g/dL    Total Bilirubin 0.6 0.2 - 1.2 mg/dL    Alkaline Phosphatase 58 40 - 130 U/L    ALT 16 5 - 41 U/L    AST 19 5 - 40 U/L   CBC with Auto Differential    Collection Time: 08/23/22 10:15 AM   Result Value Ref Range    WBC 6.3 4.8 - 10.8 K/uL    RBC 4.17 (L) 4.70 - 6.10 M/uL    Hemoglobin 13.5 (L) 14.0 - 18.0 g/dL    Hematocrit 41.1 (L) 42.0 - 52.0 %    MCV 98.6 (H) 80.0 - 94.0 fL    MCH 32.4 (H) 27.0 - 31.0 pg    MCHC 32.8 (L) 33.0 - 37.0 g/dL    RDW 11.7 11.5 - 14.5 %    Platelets 384 787 - 177 K/uL    MPV 10.0 9.4 - 12.4 fL    Neutrophils % 55.2 50.0 - 65.0 %    Lymphocytes % 30.7 20.0 - 40.0 %    Monocytes % 9.6 0.0 - 10.0 %    Eosinophils % 3.8 0.0 - 5.0 %    Basophils % 0.2 0.0 - 1.0 %    Neutrophils Absolute 3.5 1.5 - 7.5 K/uL    Immature Granulocytes # 0.0 K/uL    Lymphocytes Absolute 1.9 1.1 - 4.5 K/uL    Monocytes Absolute 0.60 0.00 - 0.90 K/uL    Eosinophils Absolute 0.20 0.00 - 0.60 K/uL    Basophils Absolute 0.00 0.00 - 0.20 K/uL   Magnesium    Collection Time: 08/23/22 10:15 AM   Result Value Ref Range    Magnesium 2.3 1.6 - 2.4 mg/dL   Troponin    Collection Time: 08/23/22 10:15 AM   Result Value Ref Range    Troponin <0.01 0.00 - 0.03 ng/mL   Brain Natriuretic Peptide    Collection Time: 08/23/22 10:15 AM   Result Value Ref Range    Pro- 0 - 1,800 pg/mL   TSH    Collection Time: 08/23/22 10:15 AM   Result Value Ref Range    TSH 2.970 0.270 - 4.200 uIU/mL   Troponin    Collection Time: 08/23/22  3:41 PM   Result Value Ref Range    Troponin <0.01 0.00 - 0.03 ng/mL       No intake/output data recorded. No results found.     Assessment and Plan:   Symptomatic bradycardia  Home beta-blocker recently [facility]  Cardiology following  TTE   Serial troponin  Follow electrolytes  TSH 2.97  Avoid offending agents  PPM as warranted  History of CAD s/p stenting    CKD 3  Follow renal function/urine output/electrolytes  Avoid offending agents    DVT prophylaxis  Lovenox    Total critical care time: 70 minutes  Total time spent managing the care of this patient: 70 minutes    Kat Kohler MD  8/23/2022 5:59 PM

## 2022-08-23 NOTE — CARE COORDINATION
Patient Contact Information:    [address] [place]  [phone] (home)   Telephone Information:   Mobile [phone]     Above information verified? [x]   Yes  []   No      Emergency Contacts:    Extended Emergency Contact Information  Primary Emergency Contact: Angelika [address] Phone: [phone]  Relation: Spouse  Secondary Emergency Contact: Xander [address] Phone: [phone]  Relation: Child      Have you been vaccinated for COVID-19 (SARS-CoV-2)? [x]   Yes  []   No                   If so, when?     Which :         [x]   Pfizer-BioNTech  []   Moderna  []   Luke Hurry  []   Other:         Pharmacy:    [facility] [phone] - [address] [place] [address]  Phone: [phone] Fax: [phone]    [facility]  ([address] [place] [address] [address] [place], [place] [phone] -  [phone]   Evelia Le [address] [address]  Phone: [phone] Fax: [phone]    [facility] [address] [place] [address]) - [place], [phone] Mireille Postin [phone]  [address] [facility] [address] [facility] [address]  Phone: [phone] Fax: [phone] [facility], [address] [place] [address] [address] [facility] [address] [phone] Mireille Postin [phone] [address] [facility] [address]  Phone: [phone] Fax: [phone]          Patient Deficits:    []   Yes   [x]   No    If yes:    []   Confusion/Memory  []   Visual  []   Motor/Sensory         []   Right arm         []   Right leg         []   Left arm         []   Left leg  []   Language/Speech         []   Aphasia         []   Dysarthria         []   Swallow         [place] Coma Scale  Eye Opening: Spontaneous  Best Verbal Response: Oriented  Best Motor Response: Obeys commands  Juanito Coma Scale Score: 15    Patient Deficit Notes:

## 2022-08-23 NOTE — CARE COORDINATION
08/23/22 1038   Service Assessment   Patient Orientation Alert and Oriented   Cognition Alert   History Provided By Patient   Primary Caregiver Self   Accompanied By/Relationship Spouse   Support Systems Spouse/Significant Other;Family Members; Alka [address] is: Legal Next of Kin   PCP Verified by CM Yes   Last Visit to PCP Within last 3 months   Prior Functional Level Independent in ADLs/IADLs;Dressing; Bathing; Toileting;Feeding;Cooking;Housework; Mobility; Shopping   Current Functional Level Independent in ADLs/IADLs; Bathing;Dressing; Toileting;Feeding;Cooking;Housework; Shopping;Mobility   Can patient return to prior living arrangement Yes   Ability to make needs known: Good   Family able to assist with home care needs: Yes   Financial Resources [employer]   (Denied needs)   CM/SW Referral   (Denied needs)   Social/Functional History   Lives With Spouse   Type of Home House   Bathroom Toilet Standard   Bathroom Equipment Grab bars in [place] [address]   (Denied need/use of home dme)   Receives Help From Family   ADL Assistance Independent   Homemaking Assistance Independent   Ambulation Assistance Independent   Transfer Assistance Independent   Discharge Planning   Type of [address] Prior To Admission None   Potential Assistance Needed   (Denied any needs at this time)   DME Ordered?  No   Potential Assistance Purchasing Medications No   Type of Home Care Services None   Patient expects to be discharged to: [facility]

## 2022-08-23 NOTE — PROGRESS NOTES
Cassandra Rosas arrived to room # 633. Presented with: bradycardia  Mental Status: Patient is oriented, alert, coherent, logical, thought processes intact, and able to concentrate and follow conversation. Vitals:    08/23/22 1200   BP: (!) 156/45   Pulse: (!) 35   Resp: 16   Temp:    SpO2: 97%     Patient safety contract and falls prevention contract reviewed with patient Yes. Oriented Patient and Family to room. Call light within reach. Yes.   Needs, issues or concerns expressed at this time: no.      Electronically signed by Naheed Houston RN on 8/23/2022 at 1:09 PM

## 2022-08-23 NOTE — CARE COORDINATION
Patient arrived for Ying Poot stress test, heart rate noted to be 29-33. RN explained to patient that Ying Poot could not be performed and advised he go to the ER for his bradycardia. Patient agreeable and taken via wheelchair to ER 2. Bedside report and EKG given to Kota Segal RN.

## 2022-08-23 NOTE — PROGRESS NOTES
4 Eyes Skin Assessment    Josseline Stephens is being assessed upon: Admission    I agree that I, Julia Membreno RN, along with Polly Zuniga RN (either 2 RN's or 1 LPN and 1 RN) have performed a thorough Head to Toe Skin Assessment on the patient. ALL assessment sites listed below have been assessed. Areas assessed by both nurses:     [x]   Head, Face, and Ears   [x]   Shoulders, Back, and Chest  [x]   Arms, Elbows, and Hands   [x]   Coccyx, Sacrum, and Ischium  [x]   Legs, Feet, and Heels    Does the Patient have Skin Breakdown?  No    Renny Prevention initiated: No  Wound Care Orders initiated: No    [facility] nurse consulted for Pressure Injury (Stage 3,4, Unstageable, DTI, NWPT, and Complex wounds) and New or Established Ostomies: No        Primary Nurse eSignature: Julia Membreno RN on 8/23/2022 at 1:10 PM      Co-Signer eSignature: Electronically signed by Swapnil Dickson RN on 8/23/22 at 4:36 PM CDT

## 2022-08-23 NOTE — ED PROVIDER NOTES
[facility] EMERGENCY DEPT  eMERGENCY dEPARTMENT eNCOUnter      Pt Name: Mathew Pelayo  MRN: [id number]  [place] 1945  Date of evaluation: 8/23/2022  Provider: Ant Wilson MD    CHIEF COMPLAINT       Chief Complaint   Patient presents with    Bradycardia     Pt to ED from [address] [place] [address] for ordered outpt Rosita scan. Pt found to be bradycardic in the 20's and 30's. Pt denies CP or SOA. HISTORY OF PRESENT ILLNESS   (Location/Symptom, Timing/Onset,Context/Setting, Quality, Duration, Modifying Factors, Severity)  Note limiting factors. Mathew Pelayo is a 67 y.o. male who presents to the emergency department for evaluation regarding bradycardia. Patient presented to the outpatient cardiac department to undergo a Lexiscan and was noted to be bradycardic with a heart rate in the 20s and 30s and was brought to the ED for evaluation. He states that about 1 week ago he had an episode where he passed out and was confused and has been feeling somewhat dizzy since that event. No prior history of similar symptoms. He was seen in the outpatient cardiology office last week and discontinued off his metoprolol. States that he has not had any fevers or chills or vomiting. He denies chest pain or feelings of shortness of breath. He does note some dizziness upon standing. There have been no relieving factors for the symptoms. Prior cardiac history includes a prior history of coronary artery disease, hyperlipidemia, hypertension. He is underwent previous stent placement. Most recent heart cath in 2018 revealed a patent LAD stent and additional stents were placed in the right coronary at that time. 2D echo performed in July 2021 revealed an EF of 55% with normal LV size and grade 2 diastolic dysfunction. HPI    NursingNotes were reviewed. REVIEW OF SYSTEMS    (2-9 systems for level 4, 10 or more for level 5)     Review of Systems   Constitutional:  Positive for fatigue.  Negative for chills and fever.   Respiratory:  Negative for cough and shortness of breath. Cardiovascular:  Positive for palpitations. Negative for chest pain. Gastrointestinal:  Negative for abdominal distention, abdominal pain, diarrhea, nausea and vomiting. Neurological:  Positive for dizziness. Psychiatric/Behavioral:  The patient is not nervous/anxious. All other systems reviewed and are negative.          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    ED (erectile dysfunction) 2/29/2016    GERD (gastroesophageal reflux disease)     Hyperlipidemia     Kidney disease stage 3     Left ventricular dysfunction     MI (myocardial infarction) ([facility] [address]          SURGICAL HISTORY       Past Surgical History:   Procedure Laterality Date    CARDIAC CATHETERIZATION      CARDIAC CATHETERIZATION      COLONOSCOPY      CORONARY ANGIOPLASTY WITH STENT PLACEMENT      HAND SURGERY      SKIN BIOPSY           CURRENT MEDICATIONS     Previous Medications    ALFUZOSIN (UROXATRAL) 10 MG EXTENDED RELEASE TABLET    Take 10 mg by mouth daily    ASCORBIC ACID (VITAMIN C) 250 MG TABLET    Take 250 mg by mouth daily    ASPIRIN 81 MG TABLET    Take 81 mg by mouth daily    ATORVASTATIN (LIPITOR) 40 MG TABLET    Take 1 tablet by mouth daily    CALCIUM-MAGNESIUM-ZINC PO    Take by mouth daily    CIMETIDINE (HEARTBURN RELIEF PO)    Take by mouth as needed    COENZYME Q10 (COQ10) 100 MG CAPS    Take by mouth daily    COVID-19 MRNA VIRUS VACCINE (PFIZER-BIONTECH COVID-19 VACC IM)    Inject into the muscle X3    DIPHENHYDRAMINE-APAP, SLEEP, (TYLENOL PM EXTRA STRENGTH)  MG TABLET    Take 1 tablet by mouth nightly    FERROUS SULFATE (FE TABS 325) 325 (65 FE) MG EC TABLET    Take 325 mg by mouth daily (with breakfast)    FINASTERIDE (PROSCAR) 5 MG TABLET    Take 5 mg by mouth daily    HYDROCHLOROTHIAZIDE (HYDRODIURIL) 12.5 MG TABLET    Take 1 tablet by mouth daily    LISINOPRIL (PRINIVIL;ZESTRIL) 2.5 MG TABLET    Take 1 tablet by mouth daily    LORATADINE (CLARITIN) 10 MG CAPSULE    Take 10 mg by mouth as needed    MULTIPLE VITAMINS-MINERALS (THERAPEUTIC MULTIVITAMIN-MINERALS) TABLET    Take 1 tablet by mouth daily    NITROGLYCERIN (NITROSTAT) 0.4 MG SL TABLET    Place 1 tablet under the tongue every 5 minutes as needed for Chest pain    TADALAFIL (CIALIS) 20 MG TABLET    Take 20 mg by mouth as needed for Erectile Dysfunction    VITAMIN D (CHOLECALCIFEROL) 125 MCG (5000 UT) CAPS CAPSULE    Take 5,000 Units by mouth daily       ALLERGIES     Patient has no known allergies. FAMILY HISTORY       Family History   Problem Relation Age of Onset    Heart Disease Mother           SOCIAL HISTORY       Social History     Socioeconomic History    Marital status:      Spouse name: None    Number of children: None    Years of education: None    Highest education level: None   Tobacco Use    Smoking status: Former     Types: Cigarettes    Smokeless tobacco: Never   Vaping Use    Vaping Use: Never used   Substance and Sexual Activity    Alcohol use: Yes     Comment: 1 beer/night    Drug use: No       SCREENINGS    [employer] Coma Scale  Eye Opening: Spontaneous  Best Verbal Response: Oriented  Best Motor Response: Obeys commands  [employer] Coma Scale Score: 15        PHYSICAL EXAM    (up to 7 for level 4, 8 or more for level 5)     ED Triage Vitals [08/23/22 0957]   BP Temp Temp src Heart Rate Resp SpO2 Height Weight   (!) 156/65 98.3 °F (36.8 °C) -- (!) 37 16 98 % 5' 9\" (1.753 m) 176 lb (79.8 kg)       Physical Exam  Vitals and nursing note reviewed.    Constitutional:       General: He is not in acute 42 (*)     GFR  51 (*)     All other components within normal limits   CBC WITH AUTO DIFFERENTIAL - Abnormal; Notable for the following components:    RBC 4.17 (*)     Hemoglobin 13.5 (*)     Hematocrit 41.1 (*)     MCV 98.6 (*)     MCH 32.4 (*)     MCHC 32.8 (*)     All other components within normal limits   COVID-19, RAPID   MAGNESIUM   TROPONIN   BRAIN NATRIURETIC PEPTIDE   TSH   TROPONIN   TROPONIN       All other labs were within normal range or not returned as of this dictation. EMERGENCY DEPARTMENT COURSE and DIFFERENTIAL DIAGNOSIS/MDM:   Vitals:    Vitals:    08/23/22 0957 08/23/22 1047   BP: (!) 156/65 (!) 148/48   Pulse: (!) 37 (!) 30   Resp: 16 15   Temp: 98.3 °F (36.8 °C)    SpO2: 98% 98%   Weight: 176 lb (79.8 kg)    Height: 5' 9\" (1.753 m)        MDM    Patient's heart rate remains bradycardic in the 30s. Blood pressure has been okay, running in the 140s. He is not symptomatic unless he stands up. He will require inpatient admission for cardiology evaluation and further treatment. CONSULTS:    Case was discussed with Dr. Elsie Ewing regarding cardiology consultation. Requested 2D echocardiogram with plans for admission. Case discussed with Dr. Eric Flynn regarding inpatient admission to the CCU to the hospitalist service. PROCEDURES:  Unless otherwise noted below, none     Procedures    FINAL IMPRESSION      1.  Symptomatic bradycardia          DISPOSITION/PLAN   DISPOSITION Admitted 08/23/2022 11:24:21 AM      (Please note that portions of this note were completed with a voice recognition program.  Efforts were made to edit thedictations but occasionally words are mis-transcribed.)    Mathew Smith MD (electronically signed)  Attending Emergency Physician            Mathew Smith MD  08/23/22 7162

## 2022-08-24 ENCOUNTER — APPOINTMENT (OUTPATIENT)
Dept: CARDIAC CATH/INVASIVE PROCEDURES | Age: 77
DRG: 229 | End: 2022-08-24
Payer: MEDICARE

## 2022-08-24 VITALS
DIASTOLIC BLOOD PRESSURE: 34 MMHG | TEMPERATURE: 97.6 F | RESPIRATION RATE: 14 BRPM | HEART RATE: 33 BPM | OXYGEN SATURATION: 100 % | WEIGHT: 176 LBS | HEIGHT: 69 IN | SYSTOLIC BLOOD PRESSURE: 134 MMHG | BODY MASS INDEX: 26.07 KG/M2

## 2022-08-24 LAB
ALBUMIN SERPL-MCNC: 3.8 G/DL (ref 3.5–5.2)
ALP BLD-CCNC: 55 U/L (ref 40–130)
ALT SERPL-CCNC: 13 U/L (ref 5–41)
ANION GAP SERPL CALCULATED.3IONS-SCNC: 12 MMOL/L (ref 7–19)
AST SERPL-CCNC: 15 U/L (ref 5–40)
BASOPHILS ABSOLUTE: 0 K/UL (ref 0–0.2)
BASOPHILS RELATIVE PERCENT: 0.3 % (ref 0–1)
BILIRUB SERPL-MCNC: 0.4 MG/DL (ref 0.2–1.2)
BUN BLDV-MCNC: 23 MG/DL (ref 8–23)
CALCIUM SERPL-MCNC: 9.2 MG/DL (ref 8.8–10.2)
CHLORIDE BLD-SCNC: 100 MMOL/L (ref 98–111)
CO2: 24 MMOL/L (ref 22–29)
CREAT SERPL-MCNC: 1.6 MG/DL (ref 0.5–1.2)
EKG P AXIS: NORMAL DEGREES
EKG P-R INTERVAL: NORMAL MS
EKG Q-T INTERVAL: 566 MS
EKG QRS DURATION: 136 MS
EKG QTC CALCULATION (BAZETT): 521 MS
EKG T AXIS: 43 DEGREES
EOSINOPHILS ABSOLUTE: 0.4 K/UL (ref 0–0.6)
EOSINOPHILS RELATIVE PERCENT: 5.3 % (ref 0–5)
GFR AFRICAN AMERICAN: 51
GFR NON-AFRICAN AMERICAN: 42
GLUCOSE BLD-MCNC: 101 MG/DL (ref 74–109)
HCT VFR BLD CALC: 41.5 % (ref 42–52)
HEMOGLOBIN: 12.9 G/DL (ref 14–18)
IMMATURE GRANULOCYTES #: 0 K/UL
LYMPHOCYTES ABSOLUTE: 2.4 K/UL (ref 1.1–4.5)
LYMPHOCYTES RELATIVE PERCENT: 30.5 % (ref 20–40)
MAGNESIUM: 2.2 MG/DL (ref 1.6–2.4)
MCH RBC QN AUTO: 32.4 PG (ref 27–31)
MCHC RBC AUTO-ENTMCNC: 31.1 G/DL (ref 33–37)
MCV RBC AUTO: 104.3 FL (ref 80–94)
MONOCYTES ABSOLUTE: 0.6 K/UL (ref 0–0.9)
MONOCYTES RELATIVE PERCENT: 7.6 % (ref 0–10)
NEUTROPHILS ABSOLUTE: 4.4 K/UL (ref 1.5–7.5)
NEUTROPHILS RELATIVE PERCENT: 55.9 % (ref 50–65)
PDW BLD-RTO: 11.8 % (ref 11.5–14.5)
PLATELET # BLD: 156 K/UL (ref 130–400)
PMV BLD AUTO: 9.9 FL (ref 9.4–12.4)
POTASSIUM SERPL-SCNC: 4.3 MMOL/L (ref 3.5–5)
RBC # BLD: 3.98 M/UL (ref 4.7–6.1)
SODIUM BLD-SCNC: 136 MMOL/L (ref 136–145)
TOTAL PROTEIN: 6 G/DL (ref 6.6–8.7)
WBC # BLD: 7.9 K/UL (ref 4.8–10.8)

## 2022-08-24 PROCEDURE — 85025 COMPLETE CBC W/AUTO DIFF WBC: CPT

## 2022-08-24 PROCEDURE — 99152 MOD SED SAME PHYS/QHP 5/>YRS: CPT

## 2022-08-24 PROCEDURE — 2580000003 HC RX 258: Performed by: INTERNAL MEDICINE

## 2022-08-24 PROCEDURE — 99152 MOD SED SAME PHYS/QHP 5/>YRS: CPT | Performed by: INTERNAL MEDICINE

## 2022-08-24 PROCEDURE — 2709999900 HC NON-CHARGEABLE SUPPLY

## 2022-08-24 PROCEDURE — 33274 TCAT INSJ/RPL PERM LDLS PM: CPT

## 2022-08-24 PROCEDURE — 99153 MOD SED SAME PHYS/QHP EA: CPT

## 2022-08-24 PROCEDURE — 2580000003 HC RX 258

## 2022-08-24 PROCEDURE — C1769 GUIDE WIRE: HCPCS

## 2022-08-24 PROCEDURE — 93010 ELECTROCARDIOGRAM REPORT: CPT | Performed by: INTERNAL MEDICINE

## 2022-08-24 PROCEDURE — 83735 ASSAY OF MAGNESIUM: CPT

## 2022-08-24 PROCEDURE — C1894 INTRO/SHEATH, NON-LASER: HCPCS

## 2022-08-24 PROCEDURE — 02HK3NZ INSERTION OF INTRACARDIAC PACEMAKER INTO RIGHT VENTRICLE, PERCUTANEOUS APPROACH: ICD-10-PCS | Performed by: INTERNAL MEDICINE

## 2022-08-24 PROCEDURE — 33274 TCAT INSJ/RPL PERM LDLS PM: CPT | Performed by: INTERNAL MEDICINE

## 2022-08-24 PROCEDURE — 2500000003 HC RX 250 WO HCPCS

## 2022-08-24 PROCEDURE — C1786 PMKR, SINGLE, RATE-RESP: HCPCS

## 2022-08-24 PROCEDURE — 36415 COLL VENOUS BLD VENIPUNCTURE: CPT

## 2022-08-24 PROCEDURE — 6360000002 HC RX W HCPCS

## 2022-08-24 PROCEDURE — 80053 COMPREHEN METABOLIC PANEL: CPT

## 2022-08-24 PROCEDURE — 6360000004 HC RX CONTRAST MEDICATION: Performed by: INTERNAL MEDICINE

## 2022-08-24 PROCEDURE — 2720000010 HC SURG SUPPLY STERILE

## 2022-08-24 PROCEDURE — 93971 EXTREMITY STUDY: CPT

## 2022-08-24 RX ORDER — SODIUM CHLORIDE 9 MG/ML
INJECTION, SOLUTION INTRAVENOUS CONTINUOUS
Status: DISCONTINUED | OUTPATIENT
Start: 2022-08-24 | End: 2022-08-24 | Stop reason: HOSPADM

## 2022-08-24 RX ORDER — ONDANSETRON 2 MG/ML
4 INJECTION INTRAMUSCULAR; INTRAVENOUS EVERY 6 HOURS PRN
Status: DISCONTINUED | OUTPATIENT
Start: 2022-08-24 | End: 2022-08-24 | Stop reason: HOSPADM

## 2022-08-24 RX ADMIN — IOPAMIDOL 70 ML: 612 INJECTION, SOLUTION INTRAVENOUS at 11:09

## 2022-08-24 RX ADMIN — SODIUM CHLORIDE: 9 INJECTION, SOLUTION INTRAVENOUS at 06:44

## 2022-08-24 ASSESSMENT — ENCOUNTER SYMPTOMS
VOMITING: 0
EYES NEGATIVE: 1
NAUSEA: 0
SHORTNESS OF BREATH: 0
GASTROINTESTINAL NEGATIVE: 1
DIARRHEA: 0
RESPIRATORY NEGATIVE: 1

## 2022-08-24 NOTE — PROCEDURES
Cardiac  Micra leadless dual chamber pacemaker implantation Report    Magda Almazan  [id number]  8/24/2022    Surgeon: Dimas Berrios    Anesthesia: Moderate IV conscious sedation    Procedure(s):   1. Implantation of a leadless pacemaker dual-chamber  2. Monitoring of conscious sedation      Indications:  1. Acquired 2:1 atrioventricular block with syncope and dizziness symptomatic and severe bradycardia      Procedure Details  The risks, benefits, complications, treatment options, and expected outcomes were discussed with the patient. The patient and/or family concurred with the proposed plan, giving informed consent. Patient was prepped and draped in the usual strict sterile fashion. After the antibiotic was completely infused, 30 cc 2% xylocaine was infiltrated into the right inguinal area. Venous access obtained utilizing a micropuncture needle under ultrasonographic guidance and the micropuncture wire was advanced followed by removal of the dilator and subsequent insertion of the micropuncture sheath and dilator. The dilator was then removed. A standard 0.035 wire was inserted into the micropuncture sheath and advanced with subsequent removal of the micropuncture sheath. A standard 6 Cook Islander sheath and dilator was then advanced over the 0.035 wire and subsequently the wire and dilator were removed. The sheath was double flushed. Next an Amplatz wire was then advanced into the sheath and directed into the superior vena cava under fluoroscopic guidance. The sheath was then removed. We then sequentially dilated the juncture with the femoral vein with progressively larger 10 Western Jeanette then 12 Western Jeanette then 18 Western Jeanette then the 18-22 graduated dilator being careful to make sure an adequate skin opening was made. Finally after adequate dilatation had occurred the 27 Cook Islander device sheath and dilator was advanced over the wire and the tip was advanced into the right atrium. The wire and dilator were removed.   The with respect to level of consciousness, and vital signs/physiologic status throughout the case. For further details regarding specific medications and doses please refer to Cath Lab procedural notes. Anesthesia start time:1005  Anesthesia stop time:1058      Pacemaker Data:       Generator  Medtronic  Model   MC 1 AVR 1  Serial   MAV [id number] ED    Pacing threshold 0.6 V  Pacing threshold 0.24 MS  Pacing impedance 670 OHMS        Estimated Blood Loss:  Minimal         Complications:  None; patient tolerated the procedure well.            Disposition: Admitted to coronary care unit           Condition: stable      Maranda Marquis MD 8/24/2022 3:25 PM

## 2022-08-24 NOTE — PROCEDURES
Cardiac catheterization laboratory preliminary report Micra AV synchrony pacemaker implanted from the right common femoral vein retrograde approach under ultrasound guidance tolerated well good parameters obtained. Sheath pulled figure-of-eight stitch placed pressure held can be potentially discharged this afternoon after he gets up and ambulates with no difficulty.

## 2022-08-24 NOTE — CONSULTS
**Physician Signature**  This document was electronically signed by: Deborah Caldera MD  2022   10:33 PM    **Consult Cover Page**  FROM: Bailee Bailey 121, [phone]  Call Back Number: [phone]  SUBJECT: Consult Recommendations  Date and Time of Report: 2022 10:33 PM CT  Items Contained in this Document: Critical Care [facility]    **Consult Information**  Member Facility: [address] [place] [address] MRN: [id number]  Visit/Encounter Number: [id number]  Consult ID: [id number]  Facility Time Zone: CT  Date and Time of Request: 2022 09:43 PM  CT  Requesting Clinician: Nguyen Miller MD  Patient Name: Virgie Halsted  YOB: 1945  Gender: Male  Patient identity was confirmed at the beginning of the consult with the   patient/family/staff using two personal identifiers: Patient name and       **Reason for Consult**  Reason for Consult: [facility]    **Admission**  Admission Date: 2022  Chief reason for ICU admission: bradycardia    **Core Metrics**  General orienting sentence for patient: () bradycardic to 20-30s when   getting Lupie Alvarez. Pacemaker planned for tomorrow.   Chief physiologic deterioration: None - Stable patient  Is the patient on DVT prophylaxis?: Yes  Prophylaxis type: Pharmacological  DVT Prophylaxis Comments: ASA, lovenox  Is the patient on GI prophylaxis?: Not Indicated  Has this patient reached their nutritional goal?: Yes  Are there current issues with pain management in this patient?:   No  Are there issues with skin integrity?: No  Are there issues with delirium?: No  Has the patient been mobilized?: Yes  Is this patient currently intubated?: No  Are there ethical or care philosophy or family issues?: No  Do you recommend an in depth evaluation?: No  Disposition Recommendations: Continue ICU level of care    **Physician Signature**  This document was electronically signed by: Deborah Caldera MD  2022   10:33 PM

## 2022-08-24 NOTE — DISCHARGE SUMMARY
Hospitalist Discharge Summary  [facility]    Patient: Dimitrios Mast  : 1945  MRN: [id number]  Code Status: Full Code  PCP: CARLOS MANUEL Jaramillo CNP  Attending: Farideh Soliman MD  Admission Date: 2022  Discharge Date: 2022    Discharge Medications:     Current Discharge Medication List             Details   finasteride (PROSCAR) 5 MG tablet Take 5 mg by mouth daily      Coenzyme Q10 (COQ10) 100 MG CAPS Take by mouth daily      CALCIUM-MAGNESIUM-ZINC PO Take by mouth daily      ferrous sulfate (FE TABS 325) 325 (65 Fe) MG EC tablet Take 325 mg by mouth daily (with breakfast)      lisinopril (PRINIVIL;ZESTRIL) 2.5 MG tablet Take 1 tablet by mouth daily  Qty: 90 tablet, Refills: 3      hydrochlorothiazide (HYDRODIURIL) 12.5 MG tablet Take 1 tablet by mouth daily  Qty: 90 tablet, Refills: 3      atorvastatin (LIPITOR) 40 MG tablet Take 1 tablet by mouth daily  Qty: 90 tablet, Refills: 0    Comments: Needs labs      nitroGLYCERIN (NITROSTAT) 0.4 MG SL tablet Place 1 tablet under the tongue every 5 minutes as needed for Chest pain  Qty: 25 tablet, Refills: 3      Multiple Vitamins-Minerals (THERAPEUTIC MULTIVITAMIN-MINERALS) tablet Take 1 tablet by mouth daily      alfuzosin (UROXATRAL) 10 MG extended release tablet Take 10 mg by mouth daily      aspirin 81 MG tablet Take 81 mg by mouth daily            Discharge Instructions:   Recommended Follow Up:  [facility]  [address]   [address] [facility] [address] [phone]  Follow up on 2022  10:30 am Device/Wound Check    CARLOS MANUEL [address], [address] [facility] [address]  [phone]    Follow up on 10/5/2022  10:45 am [address] [facility], APRN - CNP  [address]  [phone]    Schedule an appointment as soon as possible for a visit      [facility] EMERGENCY DEPT  Anish Garcia  [phone]  Go to  As needed    Future Appointments Scheduled at Time of Discharge:  Future Appointments   Date Time Provider Vincent Mares   9/2/2022 10:30 AM SCHEDULE, LPS CARDIAC DEVICE N [id number]   10/5/2022 10:45 AM CARLOS MANUEL Mora [id number]   11/29/2022 10:45 AM CARLOS MANUEL Mora [id number]   5/30/2023 10:30 AM Merlyn Goddard MD N [address] [address] Course:   Symptomatic bradycardia  Resolved  Home beta-blocker recently [facility]  Cardiology following  S/p PPM placement 8/24/2022 per Dr. Lexis Mccain  Serial troponin <0.01 x 3  TSH 2.97  Avoid offending agents  History of CAD s/p stenting  Dr. Lexis Mccain states patient stable for discharge     CKD 3  Follow renal function/urine output/electrolytes  Avoid offending agents    Extensive discussions with patient and his wife in regards to current clinical state/discharge plans. All questions sought and answered.     Patient clinically back to baseline  Patient denies any complaints currently  Patient/wife agreeable for discharge  Dr. Lexis Mccain states patient stable for discharge  Patient medically stable for discharge  Patient aware to follow-up with his outpatient providers  Patient aware to return to ER immediately with any concerning signs or symptoms    Discharge Exam:   General: no acute distress  HEENT: normocephalic, atraumatic  Neck: supple, symmetrical, trachea midline   Lungs: clear to auscultation bilaterally   Cardiovascular: s1 and s2 normal  Abdomen: soft, positive bowel sounds  Extremities: no edema or cyanosis   Neuro: aaox3, no focal deficits   Skin: normal color and texture    Recent Labs     08/23/22  1015 08/24/22  0129   WBC 6.3 7.9   HGB 13.5* 12.9*    156     Recent Labs     08/23/22  1015 08/24/22  0129   * 136   K 4.5 4.3    100   CO2 23 24   BUN 21 23   CREATININE 1.6* 1.6*   GLUCOSE 122* 101     Recent Labs     08/23/22  1015 08/24/22  0129   AST 19 15   ALT 16 13   BILITOT 0.6 0.4   ALKPHOS 58 55     Troponin T: Recent Labs     08/23/22  1015 08/23/22  1541 08/23/22  2149   TROPONINI <0.01 <0.01 <0.01     Pro-BNP: No results for input(s): BNP in the last 72 hours. INR: No results for input(s): INR in the last 72 hours. UA:No results for input(s): NITRITE, COLORU, PHUR, LABCAST, WBCUA, RBCUA, MUCUS, TRICHOMONAS, YEAST, BACTERIA, CLARITYU, SPECGRAV, LEUKOCYTESUR, UROBILINOGEN, BILIRUBINUR, BLOODU, GLUCOSEU, AMORPHOUS in the last 72 hours. A1C: No results for input(s): LABA1C in the last 72 hours. ABG:No results for input(s): PHART, CNL2ZIT, PO2ART, OLZ7INE, BEART, HGBAE, C5NGZUKG, CARBOXHGBART in the last 72 hours. XR CHEST PORTABLE    Result Date: 8/23/2022  There is relative elevation of the left hemidiaphragm with some subsegmental atelectasis noted in the lingula left base. Overall lung volume is normal.  No focal alveolar consolidation. No pleural effusion, pneumothorax, nor focal alveolar consolidation. Recommendation: Follow up as clinically indicated.  Electronically Signed by Jamia Kim MD at 23-Aug-2022 01:24:54 PM              Discharge Disposition: Pilo Mario MD  8/24/2022 2:41 PM

## 2022-08-24 NOTE — PROGRESS NOTES
Hospitalist Progress Note  [facility]     Patient: Edyta Buckner  : 1945  MRN: [id number]  Code Status: Full Code    Hospital Day: 1   Date of Service: 2022    Subjective:   Patient seen and examined. No current complaints.     Past Medical History:   Diagnosis Date    Brewer esophagus     Dr Cris Arias    CAD (coronary artery disease) 2016  Acute inferior ST segment elevation MI, received TNKase at [facility], AUC indication 2, AUC score 9, [facility] ;[id number]  Cath 90% mid LAD 2.25 x 15 and 2.25 x 18 KAVITA, o/w luminals, apical dyskinesis, EF 45%    Cancer (HCC)     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    ED (erectile dysfunction) 2016    GERD (gastroesophageal reflux disease)     Hyperlipidemia     Kidney disease stage 3     Left ventricular dysfunction     MI (myocardial infarction) ([address]        Past Surgical History:   Procedure Laterality Date    CARDIAC CATHETERIZATION      CARDIAC CATHETERIZATION      COLONOSCOPY      CORONARY ANGIOPLASTY WITH STENT PLACEMENT      HAND SURGERY      SKIN BIOPSY         Family History   Problem Relation Age of Onset    Heart Disease Mother        Social History     Socioeconomic History    Marital status:      Spouse name: Not on file    Number of children: Not on file    Years of education: Not on file    Highest education level: Not on file   Occupational History    Not on file   Tobacco Use    Smoking status: Former     Types: Cigarettes    Smokeless tobacco: Never   Vaping Use    Vaping Use: Never used   Substance and Sexual Activity    Alcohol use: Yes     Comment: 1 beer/night    Drug use: No    Sexual activity: Not on file   Other Topics Concern    Not on file   Social History Narrative    Not on file     Social Determinants of Health     Financial Resource Strain: Not on file   Food Insecurity: Not on file   Transportation Needs: Not on file   Physical Activity: Not on file   Stress: Not on file   Social Connections: Not on file   Intimate Partner Violence: Not on file   Housing Stability: Not on file       Current Facility-Administered Medications   Medication Dose Route Frequency Provider Last Rate Last Admin    0.9 % sodium chloride infusion   IntraVENous Continuous Alphonso Lobo  mL/hr at 08/24/22 0644 New Bag at 08/24/22 0644    ceFAZolin (ANCEF) 2,000 mg in sterile water 20 mL IV syringe  2,000 mg IntraVENous On Call to Mundo Ha MD        chlorhexidine gluconate (ANTISEPTIC SKIN CLEANSER) 4 % solution   Topical Once Alphonso Lobo MD        mupirocin (BACTROBAN) 2 % ointment   Nasal Once Alphonso Lobo MD        0.9 % sodium chloride infusion   IntraVENous Continuous Alphonso Lobo MD        ondansetron [facility]) injection 4 mg  4 mg IntraVENous Q6H PRN Alphonso Lobo MD        aspirin chewable tablet 81 mg  81 mg Oral Daily Isabel Walsh MD        atorvastatin (LIPITOR) tablet 40 mg  40 mg Oral Nightly Isabel Walsh MD   40 mg at 08/23/22 2134    ferrous sulfate (IRON 325) tablet 325 mg  325 mg Oral Daily with breakfast Isabel Walsh MD        lisinopril (PRINIVIL;ZESTRIL) tablet 2.5 mg  2.5 mg Oral Daily Isabel Walsh MD        nitroGLYCERIN (NITROSTAT) SL tablet 0.4 mg  0.4 mg SubLINGual Q5 Min PRN Isabel Walsh MD        sodium chloride flush 0.9 % injection 5-40 mL  5-40 mL IntraVENous 2 times per day Isabel Walsh MD   10 mL at 08/23/22 2134    sodium chloride flush 0.9 % injection 5-40 mL  5-40 mL IntraVENous PRN Isabel Walsh MD        0.9 % sodium chloride infusion   IntraVENous PRN Isabel Walsh MD        enoxaparin (LOVENOX) injection 40 mg  40 mg SubCUTAneous Q24H Isabel Walsh MD   40 mg at 08/23/22 1433    acetaminophen (TYLENOL) tablet 650 mg  650 mg Oral Q6H PRN Isabel Walsh MD        Or    acetaminophen (TYLENOL) suppository 650 mg  650 mg Rectal Q6H PRN Isabel Walsh MD        perflutren lipid microspheres (DEFINITY) injection 1.65 mg  1.5 mL IntraVENous ONCE PRN Merissa Lawton MD   1.5 mL at 08/23/22 1357    diphenhydrAMINE (BENADRYL) tablet 25 mg  25 mg Oral Nightly PRN Franco Mcgee MD        acetaminophen (TYLENOL) tablet 500 mg  500 mg Oral QHS PRN Franco Mcgee MD   500 mg at 08/23/22 2131     Facility-Administered Medications Ordered in Other Encounters   Medication Dose Route Frequency Provider Last Rate Last Admin    regadenoson (LEXISCAN) injection 0.4 mg  0.4 mg IntraVENous ONCE PRN Jannell Half, APRN        technetium tetrofosmin (Tc-MYOVIEW) injection 24 millicurie  24 millicurie IntraVENous ONCE PRN Jannell Half, APRN             sodium chloride 125 mL/hr at 08/24/22 0644    sodium chloride      sodium chloride          Objective:   BP (!) 134/34   Pulse (!) 33   Temp 97.6 °F (36.4 °C) (Temporal)   Resp 14   Ht 5' 9\" (1.753 m)   Wt 176 lb (79.8 kg)   SpO2 100%   BMI 25.99 kg/m²     General: no acute distress  HEENT: normocephalic, atraumatic  Neck: supple, symmetrical, trachea midline   Lungs: clear to auscultation bilaterally   Cardiovascular: s1 and s2 normal, bradycardia  Abdomen: soft, positive bowel sounds  Extremities: no edema or cyanosis   Neuro: aaox3, no focal deficits   Skin: normal color and texture     Recent Labs     08/23/22  1015 08/24/22  0129   WBC 6.3 7.9   RBC 4.17* 3.98*   HGB 13.5* 12.9*   HCT 41.1* 41.5*   MCV 98.6* 104.3*   MCH 32.4* 32.4*   MCHC 32.8* 31.1*    156     Recent Labs     08/23/22  1015 08/24/22  0129   * 136   K 4.5 4.3   ANIONGAP 10 12    100   CO2 23 24   BUN 21 23   CREATININE 1.6* 1.6*   GLUCOSE 122* 101   CALCIUM 9.4 9.2     Recent Labs     08/23/22  1015 08/24/22  0129   MG 2.3 2.2     Recent Labs     08/23/22  1015 08/24/22  0129   AST 19 15   ALT 16 13   BILITOT 0.6 0.4   ALKPHOS 58 55     No results for input(s): PH, PO2, PCO2, HCO3, BE, O2SAT in the last 72 hours.   Recent Labs     08/23/22  1015 08/23/22  1541 08/23/22  0869 TROPONINI <0.01 <0.01 <0.01     No results for input(s): INR in the last 72 hours. No results for input(s): LACTA in the last 72 hours. Intake/Output Summary (Last 24 hours) at 8/24/2022 1119  Last data filed at 8/24/2022 2492  Gross per 24 hour   Intake 211.71 ml   Output 300 ml   Net -88.29 ml       XR CHEST PORTABLE    Result Date: 8/23/2022  NO PRIOR REPORT AVAILABLE [facility] Clinical data:Chest pain. Technique:Single view of the chest. Prior studies: No prior studies submitted. Findings: The trachea is midline. The aorta is normal in caliber. The heart is normal in size and configuration. The lungs are well expanded on the right, and somewhat underexpanded on the left, otherwise free of active infiltrate. The pulmonary derrick and mediastinum are unremarkable. No pleural effusion nor pneumothorax. The soft tissues and bony structures demonstrate no acute pathology. Monitoring leads overlie the chest.    There is relative elevation of the left hemidiaphragm with some subsegmental atelectasis noted in the lingula left base. Overall lung volume is normal.  No focal alveolar consolidation. No pleural effusion, pneumothorax, nor focal alveolar consolidation. Recommendation: Follow up as clinically indicated. Electronically Signed by Daly Baig MD at 23-Aug-2022 01:24:54 PM              Assessment and Plan:   Symptomatic bradycardia  Home beta-blocker recently [facility]  Cardiology following  Plans for PPM  Serial troponin <0.01 x 3  Follow electrolytes  TSH 2.97  Avoid offending agents  History of CAD s/p stenting     CKD 3  Follow renal function/urine output/electrolytes  Avoid offending agents    DVT prophylaxis  Lovenox    Extensive discussion with patient and his wife in regards to current clinical state. All questions sought and answered.     Total critical care time: 45 minutes    Maxine Pierce MD   8/24/2022 11:19 AM

## 2022-08-24 NOTE — CONSULTS
[facility]  Cardiology Consult      Requesting MD:  Simeon Vásquez MD   Admit Status:         History obtained from:   [] Patient  [] Other (specify):     Patient:  Vivian Smith  [id number]     Chief Complaint:   Chief Complaint   Patient presents with    Bradycardia     Pt to ED from [facility] for ordered outpt Rosita scan. Pt found to be bradycardic in the 20's and 30's. Pt denies CP or SOA. HPI: Mr. Toy Dominguez is a 67 y.o. male with a history of coronary disease several stents previously placed recently noted to be bradycardic seen by Maximino Hawkins 8/15/2022 metoprolol discontinued Zio patch ordered. Has had more shortness of breath and fatigue and dizziness recently. Cannot finish his golf game 1 day the week before had a seizure-like activity according to his wife possible syncopal episode. Zio patch ordered along with a nuclear stress test.  The patient came in on this day for scheduled Rehan Jada noted to have bradycardia sent to the emergency department admitted cardiology consulted now. Troponins negative thus far. proBNP 288. EKG and telemetry suggest atrioventricular block rate in the 30s around 35 with 2:1 atrioventricular block. Sinus rate about 70-75. Review of Systems:  Review of Systems   Constitutional: Negative. Negative for chills, fever and unexpected weight change. HENT: Negative. Eyes: Negative. Respiratory: Negative. Negative for shortness of breath. Cardiovascular: Negative. Negative for chest pain. Gastrointestinal: Negative. Negative for diarrhea, nausea and vomiting. Endocrine: Negative. Genitourinary: Negative. Musculoskeletal: Negative. Skin: Negative. Neurological: Negative. All other systems reviewed and are negative.     Cardiac Specific Data:  Specialty Problems          Cardiology Problems    * (Principal) Symptomatic bradycardia        CAD (coronary artery disease)        Left ventricular dysfunction        MI [facility]        Hyperlipidemia        Essential hypertension        Bradycardia           Past Medical History:  Past Medical History:   Diagnosis Date    Brewer esophagus     Dr Selby Or    CAD (coronary artery disease) 2/29/2016 2/29/16  Acute inferior ST segment elevation MI, received TNKase at [facility], AUC indication 2, AUC score 9, [facility] [id number] 2/29/16  Cath 90% mid LAD 2.25 x 15 and 2.25 x 18 KAVITA, o/w luminals, apical dyskinesis, EF 45%    Cancer (HCC)     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    ED (erectile dysfunction) 2/29/2016    GERD (gastroesophageal reflux disease)     Hyperlipidemia     Kidney disease stage 3     Left ventricular dysfunction     MI (myocardial infarction) ([facility]         Past Surgical History:  Past Surgical History:   Procedure Laterality Date    CARDIAC CATHETERIZATION      CARDIAC CATHETERIZATION      COLONOSCOPY      CORONARY ANGIOPLASTY WITH STENT PLACEMENT      HAND SURGERY      SKIN BIOPSY         Past Family History:  Family History   Problem Relation Age of Onset    Heart Disease Mother        Past Social History:  Social History     Socioeconomic History    Marital status:      Spouse name: Not on file    Number of children: Not on file    Years of education: Not on file    Highest education level: Not on file   Occupational History    Not on file   Tobacco Use    Smoking status: Former     Types: Cigarettes    Smokeless tobacco: Never   Vaping Use    Vaping Use: Never used   Substance and Sexual Activity    Alcohol use: Yes     Comment: 1 beer/night    Drug use: No    Sexual activity: Not on file   Other Topics Concern    Not on file   Social History Narrative    Not on file     Social Determinants of Health     Financial Resource Strain: Not on file   Food Insecurity: Not on file   Transportation Needs: Not on file   Physical Activity: Not on file   Stress: Not on file   Social Connections: Not on file   Intimate Partner Violence: Not on file   Housing Stability: Not on file       Allergies:  No Known Allergies    Home Meds:  Prior to Admission medications    Medication Sig Start Date End Date Taking?  Authorizing Provider   finasteride (PROSCAR) 5 MG tablet Take 5 mg by mouth daily    Historical Provider, MD   Coenzyme Q10 (COQ10) 100 MG CAPS Take by mouth daily    Historical Provider, MD   Cimetidine (HEARTBURN RELIEF PO) Take by mouth as needed    Historical Provider, MD   CALCIUM-MAGNESIUM-ZINC PO Take by mouth daily    Historical Provider, MD   Ascorbic Acid (VITAMIN C) 250 MG tablet Take 250 mg by mouth daily    Historical Provider, MD   vitamin D (CHOLECALCIFEROL) 125 MCG (5000 UT) CAPS capsule Take 5,000 Units by mouth daily  Patient not taking: Reported on 8/15/2022    Historical Provider, MD   ferrous sulfate (FE TABS 325) 325 (65 Fe) MG EC tablet Take 325 mg by mouth daily (with breakfast)    Historical Provider, MD   tadalafil (CIALIS) 20 MG tablet Take 20 mg by mouth as needed for Erectile Dysfunction    Historical Provider, MD   COVID-19 mRNA Virus Vaccine (PFIZER-BIONTECH COVID-19 VACC IM) Inject into the muscle X3    Historical Provider, MD   lisinopril (PRINIVIL;ZESTRIL) 2.5 MG tablet Take 1 tablet by mouth daily 6/17/20   CARLOS MANUEL Fraire   hydrochlorothiazide (HYDRODIURIL) 12.5 MG tablet Take 1 tablet by mouth daily 6/17/20   CARLOS MANUEL Fraire   atorvastatin (LIPITOR) 40 MG tablet Take 1 tablet by mouth daily 6/17/20   CARLOS MANUEL Fraire   nitroGLYCERIN (NITROSTAT) 0.4 MG SL tablet Place 1 tablet under the tongue every 5 minutes as needed for Chest pain 1/23/19   CARLOS MANUEL Fraire   loratadine (CLARITIN) 10 MG capsule Take 10 mg by mouth as needed    Historical Provider, MD   diphenhydrAMINE-APAP, sleep, (TYLENOL PM EXTRA STRENGTH)  MG tablet Take 1 tablet by mouth nightly    Historical Provider, MD   Multiple Vitamins-Minerals (THERAPEUTIC MULTIVITAMIN-MINERALS) tablet Take 1 tablet by mouth daily    Historical Provider, MD   alfuzosin (UROXATRAL) 10 MG extended release tablet Take 10 mg by mouth daily    Historical Provider, MD   aspirin 81 MG tablet Take 81 mg by mouth daily    Historical Provider, MD       Current Meds:   ceFAZolin (ANCEF) IVPB  2,000 mg IntraVENous On Call to OR    chlorhexidine gluconate   Topical Once    mupirocin   Nasal Once    aspirin  81 mg Oral Daily    atorvastatin  40 mg Oral Nightly    ferrous sulfate  325 mg Oral Daily with breakfast    lisinopril  2.5 mg Oral Daily    sodium chloride flush  5-40 mL IntraVENous 2 times per day    enoxaparin  40 mg SubCUTAneous Q24H       Current Infused Meds:   sodium chloride 125 mL/hr at 08/24/22 0644    sodium chloride         Physical Exam:  Vitals:    08/24/22 0700   BP: (!) 134/34   Pulse: (!) 33   Resp: 23   Temp:    SpO2: 97%     No intake or output data in the 24 hours ending 08/24/22 0756  Estimated body mass index is 25.99 kg/m² as calculated from the following:    Height as of this encounter: 5' 9\" (1.753 m). Weight as of this encounter: 176 lb (79.8 kg). Physical Exam  Vitals reviewed. Constitutional:       General: He is not in acute distress. Appearance: Normal appearance. He is well-developed and normal weight. He is not ill-appearing, toxic-appearing or diaphoretic. HENT:      Head: Normocephalic and atraumatic. Nose: Nose normal.      Mouth/Throat:      Mouth: Mucous membranes are moist.      Pharynx: Oropharynx is clear. Eyes:      General: No scleral icterus. Extraocular Movements: Extraocular movements intact. Pupils: Pupils are equal, round, and reactive to light. Neck:      Vascular: No carotid bruit or JVD. Cardiovascular:      Rate and Rhythm: Regular rhythm. Bradycardia present. Heart sounds: Normal heart sounds. No murmur heard. No friction rub. No gallop. Pulmonary:      Effort: Pulmonary effort is normal. No respiratory distress.       Breath sounds: Normal breath sounds. No stridor. No wheezing, rhonchi or rales. Chest:      Chest wall: No tenderness. Abdominal:      General: Abdomen is flat. Bowel sounds are normal. There is no distension. Palpations: Abdomen is soft. There is no mass. Tenderness: There is no abdominal tenderness. There is no right CVA tenderness, left CVA tenderness, guarding or rebound. Hernia: No hernia is present. Musculoskeletal:         General: No swelling, tenderness, deformity or signs of injury. Cervical back: Normal range of motion and neck supple. No rigidity or tenderness. Right lower leg: No edema. Left lower leg: No edema. Lymphadenopathy:      Cervical: No cervical adenopathy. Skin:     General: Skin is warm and dry. Neurological:      General: No focal deficit present. Mental Status: He is alert and oriented to person, place, and time. Mental status is at baseline. Cranial Nerves: No cranial nerve deficit. Sensory: No sensory deficit. Motor: No weakness. Coordination: Coordination normal.   Psychiatric:         Mood and Affect: Mood normal.         Behavior: Behavior normal.         Thought Content: Thought content normal.         Judgment: Judgment normal.       Labs:  Recent Labs     08/23/22  1015 08/24/22  0129   WBC 6.3 7.9   HGB 13.5* 12.9*    156       Recent Labs     08/23/22  1015 08/24/22  0129   * 136   K 4.5 4.3    100   CO2 23 24   BUN 21 23   CREATININE 1.6* 1.6*   LABGLOM 42* 42*   MG 2.3 2.2   CALCIUM 9.4 9.2       CK, CKMB, Troponin: @LABRCNT (CKTOTAL:3, CKMB:3, TROPONINI:3)@    Last 3 BNP:  No results for input(s): BNP in the last 72 hours. IMAGING:  XR CHEST PORTABLE    Result Date: 8/23/2022  NO PRIOR REPORT AVAILABLE Exam: X-RAY OF [facility] Clinical data:Chest pain. Technique:Single view of the chest. Prior studies: No prior studies submitted. Findings: The trachea is midline. The aorta is normal in caliber.   The heart is normal

## 2022-08-25 ENCOUNTER — CARE COORDINATION (OUTPATIENT)
Dept: CASE MANAGEMENT | Age: 77
End: 2022-08-25

## 2022-08-25 NOTE — CARE COORDINATION
[facility] Transitions Initial Follow Up Call    Call within 2 business days of discharge: Yes    Patient: Mathew Puri Patient : 1945   MRN: [id number]  Reason for Admission:   Discharge Date: 22 RARS: Readmission Risk Score: 9.9      Last Discharge [facility]       Date Complaint Diagnosis Description Type Department Provider    22 Bradycardia Symptomatic bradycardia ED to Hosp-Admission (Discharged) (ADMITTED) L CCU Maki Cho MD; Willie Lyon, . .. Spoke with: [address]    Non-face-to-face services provided:  Obtained and reviewed discharge summary and/or continuity of care documents Reviewed encounter information for continuity of care prior to follow up phone call - chart notes, consults, progress notes, test results, med list, appointments, AVS, other information. Advance Care Planning   Healthcare Decision Maker:    Primary Decision Maker: Tania Guzman - [phone]    Transitions of Care Initial Call    Was this an external facility discharge? No Discharge Facility:     Challenges to be reviewed by the provider   Additional needs identified to be addressed with provider: No  none             Method of communication with provider : none    Advance Care Planning:   Does patient have an Advance Directive: reviewed and current. Care Transition Nurse contacted the patient by telephone to perform post hospital discharge assessment. Verified name and  with patient as identifiers. Provided introduction to self, and explanation of the CTN role. CTN reviewed discharge instructions, medical action plan and red flags with patient who verbalized understanding. Patient given an opportunity to ask questions and does not have any further questions or concerns at this time. Were discharge instructions available to patient? Yes.  Reviewed appropriate site of care based on symptoms and resources available to patient including: PCP  Specialist. The patient agrees to contact the PCP office for questions related to their healthcare. Medication reconciliation was performed with patient, who verbalizes understanding of administration of home medications. Advised obtaining a 90-day supply of all daily and as-needed medications. Was patient discharged with a pulse oximeter? no    CTN provided contact information. No further follow-up call indicated based on severity of symptoms and risk factors. Care Transitions 24 Hour Call    Do you have a copy of your discharge instructions?: Yes  Do you have all of your prescriptions and are they filled?: Yes  Have you been contacted by a [address]?: No  Have you scheduled your follow up appointment?: Yes  How are you going to get to your appointment?: Car - family or friend to transport  Do you have support at home?: Partner/Spouse/SO  Do you feel like you have everything you need to keep you well at home?: Yes  Are you an active caregiver in your home?: No  Care Transitions Interventions         Follow Up : Spoke with patient today for CT call after discharge from [place]. He says he feels pretty good. Says he did have a little bleeding from groin access but it has stopped and not further bleeding. CTN advised him to contact Cardiology with any further bleeding issues. He says pacer incision shows no breakthrough bleeding. He is eating and drinking, did review meds, aware of ones to stop. Did review those as well. He is not SOA, no chest pain. He has HFU with PCP on 8/30 and wound check with cardio on 9/2 and HFU with cardio on 10/5. He says his wife is good caretaker. He has had the Covid vaccine and booster, listed PHCDM and has LW on file. NO complaints or problems to speak and following up with non-[facility] provider. No further outreach warranted.    Future Appointments   Date Time Provider Vincent Mares   9/2/2022 10:30 AM SCHEDULE, LPS CARDIAC DEVICE N [facility] Cardio [facility]   10/5/2022 10:45 AM CARLOS MANUEL Chatterjee N [facility]   11/29/2022 10:45 AM CARLOS MANUEL Chatterjee N [facility]   5/30/2023 10:30 AM Vianey Mabry MD N LPS Cardio [facility]       Connor Figueroa RN

## 2022-08-30 PROCEDURE — 93244 EXT ECG>48HR<7D REV&INTERPJ: CPT | Performed by: NURSE PRACTITIONER

## 2022-08-31 DIAGNOSIS — R00.1 BRADYCARDIA: Primary | ICD-10-CM

## 2022-09-01 ENCOUNTER — NURSE ONLY (OUTPATIENT)
Dept: CARDIOLOGY CLINIC | Age: 77
End: 2022-09-01

## 2022-09-01 DIAGNOSIS — Z51.89 VISIT FOR WOUND CHECK: Primary | ICD-10-CM

## 2022-09-01 PROCEDURE — 99024 POSTOP FOLLOW-UP VISIT: CPT | Performed by: INTERNAL MEDICINE

## 2022-09-01 NOTE — PATIENT INSTRUCTIONS
Please call [employer] Get connected at [phone] to discuss home monitor options.

## 2022-09-01 NOTE — PROGRESS NOTES
Patient here for a wound check visit status post Pacemaker implant. Incision dry and intact. No redness, swelling, or drainage noted  Edges well approximated  Instructed patient to wash area with antibacterial soap and keep it clean and dry. Reviewed discharged instructions and questions answered.   Reviewed [facility] home monitor and patient verbalized understanding  Follow up as scheduled

## 2022-10-05 ENCOUNTER — OFFICE VISIT (OUTPATIENT)
Dept: CARDIOLOGY CLINIC | Age: 77
End: 2022-10-05
Payer: MEDICARE

## 2022-10-05 VITALS
HEART RATE: 60 BPM | HEIGHT: 69 IN | SYSTOLIC BLOOD PRESSURE: 138 MMHG | DIASTOLIC BLOOD PRESSURE: 70 MMHG | WEIGHT: 179 LBS | BODY MASS INDEX: 26.51 KG/M2

## 2022-10-05 DIAGNOSIS — Z95.0 PACEMAKER: ICD-10-CM

## 2022-10-05 DIAGNOSIS — I10 ESSENTIAL HYPERTENSION: Primary | ICD-10-CM

## 2022-10-05 DIAGNOSIS — R00.1 BRADYCARDIA: ICD-10-CM

## 2022-10-05 PROCEDURE — G8427 DOCREV CUR MEDS BY ELIG CLIN: HCPCS | Performed by: CLINICAL NURSE SPECIALIST

## 2022-10-05 PROCEDURE — G8484 FLU IMMUNIZE NO ADMIN: HCPCS | Performed by: CLINICAL NURSE SPECIALIST

## 2022-10-05 PROCEDURE — 1036F TOBACCO NON-USER: CPT | Performed by: CLINICAL NURSE SPECIALIST

## 2022-10-05 PROCEDURE — G8417 CALC BMI ABV UP PARAM F/U: HCPCS | Performed by: CLINICAL NURSE SPECIALIST

## 2022-10-05 PROCEDURE — 1123F ACP DISCUSS/DSCN MKR DOCD: CPT | Performed by: CLINICAL NURSE SPECIALIST

## 2022-10-05 PROCEDURE — 93279 PRGRMG DEV EVAL PM/LDLS PM: CPT | Performed by: CLINICAL NURSE SPECIALIST

## 2022-10-05 PROCEDURE — 99214 OFFICE O/P EST MOD 30 MIN: CPT | Performed by: CLINICAL NURSE SPECIALIST

## 2022-10-05 RX ORDER — IBUPROFEN 200 MG
TABLET ORAL
COMMUNITY

## 2022-10-05 RX ORDER — TADALAFIL 20 MG/1
TABLET ORAL
COMMUNITY

## 2022-10-05 RX ORDER — ATORVASTATIN CALCIUM 40 MG/1
TABLET, FILM COATED ORAL
COMMUNITY
End: 2022-10-05

## 2022-10-05 RX ORDER — FAMOTIDINE 40 MG/1
TABLET, FILM COATED ORAL
COMMUNITY

## 2022-10-05 RX ORDER — SILDENAFIL CITRATE 20 MG/1
TABLET ORAL
COMMUNITY
End: 2022-10-05
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
## 2022-10-05 NOTE — PATIENT INSTRUCTIONS
Monitor BP - if staying > 140/90 then increase your lisinopril back to 5mg daily  Return to see Dr. Dalia Nina in May  Continue same medications. Call with any problems, questions or concerns. Continue to see primary care provider for non cardiac concerns. Send [facility] in 3 mos- education provided.

## 2022-10-06 ENCOUNTER — TELEPHONE (OUTPATIENT)
Dept: CARDIOLOGY CLINIC | Age: 77
End: 2022-10-06

## 2022-10-06 NOTE — TELEPHONE ENCOUNTER
Patient called and left voice message and stated he is dizzy this morning and his blood pressure is high at 164/87. Returned called and he stated when he got up his equilibruim was off and he was staggering and dizzy. He checked his blood pressure shortly after and it was 164/87. He took Lisinopril 5mg. He BP is now 152/79. He did not leave for [place] a planned. He stated his pulse is ok running in the 60's. He is still dizzy.

## 2022-10-06 NOTE — TELEPHONE ENCOUNTER
Blood pressure is still running high increase his lisinopril to 5 mg as we discussed yesterday  If dizziness is a spinning sensation associated with equilibrium and balance see PCP.   May be vertigo

## 2022-10-06 NOTE — TELEPHONE ENCOUNTER
Notified patient of recommendations. He did take the Lisinopril 5mg this am.  He will check with his PCP. He wanted to know if a cortisone shot could have caused his BP to elevate. He stated his BP has not been this high. The last reading was 141/87.

## 2022-10-06 NOTE — TELEPHONE ENCOUNTER
Pt called stating his BP today was 150/82 and he took his lisinopril. Pt advised to cont his lisinopril since he just started it today and he wasn't having any symptoms and monitor his BP x 1 week and let us know how he is doing. Pt verbally understood. Pt advised if he get any just pain to report to ER.

## 2022-11-02 ENCOUNTER — TELEPHONE (OUTPATIENT)
Dept: CARDIOLOGY CLINIC | Age: 77
End: 2022-11-02

## 2022-11-02 NOTE — TELEPHONE ENCOUNTER
Received unscheduled carelink remote pacemaker interrogation showing no new observations. Called patient to find out why he sent. He stated the monitor lit up and he followed instructions to send. His next carelink is due 1/5/23.

## 2023-01-06 ENCOUNTER — TELEPHONE (OUTPATIENT)
Dept: CARDIOLOGY CLINIC | Age: 78
End: 2023-01-06

## 2023-01-09 DIAGNOSIS — R00.1 BRADYCARDIA: ICD-10-CM

## 2023-01-09 DIAGNOSIS — Z95.0 PACEMAKER: Primary | ICD-10-CM

## 2023-05-09 NOTE — ED NOTES
Bernardino Avila ([address]) for Dr. Wing Nearing  08/23/22 [phone]
Dr Miko Carmichael at pt's BS.      Genia Echeverria, RIYA  08/23/22 [id number]
None known

## 2023-05-30 ENCOUNTER — OFFICE VISIT (OUTPATIENT)
Dept: CARDIOLOGY CLINIC | Age: 78
End: 2023-05-30
Payer: MEDICARE

## 2023-05-30 VITALS
WEIGHT: 187 LBS | HEART RATE: 60 BPM | HEIGHT: 69 IN | DIASTOLIC BLOOD PRESSURE: 70 MMHG | BODY MASS INDEX: 27.7 KG/M2 | SYSTOLIC BLOOD PRESSURE: 118 MMHG

## 2023-05-30 DIAGNOSIS — I10 ESSENTIAL HYPERTENSION: ICD-10-CM

## 2023-05-30 DIAGNOSIS — I25.10 CORONARY ARTERY DISEASE INVOLVING NATIVE CORONARY ARTERY OF NATIVE HEART WITHOUT ANGINA PECTORIS: Primary | ICD-10-CM

## 2023-05-30 DIAGNOSIS — R00.1 BRADYCARDIA: ICD-10-CM

## 2023-05-30 DIAGNOSIS — Z95.0 PACEMAKER: ICD-10-CM

## 2023-05-30 PROCEDURE — G8417 CALC BMI ABV UP PARAM F/U: HCPCS | Performed by: CLINICAL NURSE SPECIALIST

## 2023-05-30 PROCEDURE — 93288 INTERROG EVL PM/LDLS PM IP: CPT | Performed by: CLINICAL NURSE SPECIALIST

## 2023-05-30 PROCEDURE — 3078F DIAST BP <80 MM HG: CPT | Performed by: CLINICAL NURSE SPECIALIST

## 2023-05-30 PROCEDURE — 99214 OFFICE O/P EST MOD 30 MIN: CPT | Performed by: CLINICAL NURSE SPECIALIST

## 2023-05-30 PROCEDURE — G8427 DOCREV CUR MEDS BY ELIG CLIN: HCPCS | Performed by: CLINICAL NURSE SPECIALIST

## 2023-05-30 PROCEDURE — 1123F ACP DISCUSS/DSCN MKR DOCD: CPT | Performed by: CLINICAL NURSE SPECIALIST

## 2023-05-30 PROCEDURE — 3074F SYST BP LT 130 MM HG: CPT | Performed by: CLINICAL NURSE SPECIALIST

## 2023-05-30 PROCEDURE — 1036F TOBACCO NON-USER: CPT | Performed by: CLINICAL NURSE SPECIALIST

## 2023-05-30 RX ORDER — CHOLECALCIFEROL (VITAMIN D3) 25 MCG
TABLET ORAL
COMMUNITY

## 2023-05-30 RX ORDER — LISINOPRIL 10 MG/1
10 TABLET ORAL DAILY
COMMUNITY

## 2023-05-30 NOTE — PATIENT INSTRUCTIONS
Pacemaker - send 8-30-23  Maintain good blood pressure control-goal<130/80 at rest  Maintain good cholesterol control LDL goal<70 with arterial disease  If you are diabetic work to keep/obtain hemoglobin A1c< 7    Follow up in 6 mos With Dr. Robson Moon   Call with any questions or concerns  Follow up with Mateo Postal for non cardiac problems  Report any new problems  Cardiovascular Fitness-Exercise as tolerated. Strive for 30 minutes of exercise most days of the week. Cardiac / Healthy Diet- Avoid processed high fat foods, maintain low sodium/salt   Continue current medications as directed  Continue plan of treatment  It is always recommended that you bring your medications bottles with you to each visit - this is for your safety!

## 2023-05-30 NOTE — PROGRESS NOTES
[address] [place], [address] [address]  Phone: [phone]  Fax: [phone]    OFFICE VISIT:  5/30/2023    Aylin Mello [address]: 1945    Reason For Visit:  Mallory Hein is a 65 y.o. male who is here for Irregular Heart Beat (Pacemaker)  History dyslipidemia, hypertension, past tobacco abuse, coronary disease with apical infarct in 2016. Stents placed to LAD with EF 45%. 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 Heart cath February 2018 showed patent LAD stents with additional stenting to right.  7/22/2021 nuclear stress test showed large fixed inferolateral apical defect with no new ischemia. EF 58%  2D echo showed normal LV size with distal apical hypokinesis with EF preserved at 55%. Grade 2 diastolic dysfunction. No significant valvular disease noted. PaceMaker placed 8/23/2022. He returns today for routine follow-up accompanied with his wife. He remains active and doing well. No cardiac symptoms. PCP managing labs    Subjective  Mallory Hein denies exertional chest pain, shortness of breath, orthopnea, paroxysmal nocturnal dyspnea, syncope, presyncope, arrhythmia, edema and fatigue. The patient denies numbness or weakness to suggest cerebrovascular accident or transient ischemic attack. Aditi Fuentes is PCP and follows labs.   Orbruno Isac has the following history as [facility] in [facility]:    Patient Active Problem List    Diagnosis Date Noted    Symptomatic bradycardia 08/23/2022    Bradycardia 07/02/2019    Essential hypertension 01/23/2019    History of placement of stent in LAD coronary artery 01/23/2019    Hyperlipidemia     MI (myocardial infarction) ([facility] [address]     Left ventricular dysfunction     CAD (coronary artery disease) 02/29/2016    ED (erectile dysfunction) 02/29/2016     Past Medical History:   Diagnosis Date    Brewer esophagus     Dr Sloane Gomes    CAD (coronary artery disease) 2/29/2016 2/29/16  Acute inferior ST

## 2023-08-31 ENCOUNTER — TELEPHONE (OUTPATIENT)
Dept: CARDIOLOGY CLINIC | Age: 78
End: 2023-08-31

## 2023-08-31 DIAGNOSIS — R00.1 BRADYCARDIA: ICD-10-CM

## 2023-08-31 DIAGNOSIS — Z95.0 PACEMAKER: Primary | ICD-10-CM

## 2023-08-31 PROCEDURE — 93294 REM INTERROG EVL PM/LDLS PM: CPT | Performed by: CLINICAL NURSE SPECIALIST

## 2023-08-31 PROCEDURE — 93296 REM INTERROG EVL PM/IDS: CPT | Performed by: CLINICAL NURSE SPECIALIST

## 2023-11-30 ENCOUNTER — OFFICE VISIT (OUTPATIENT)
Dept: CARDIOLOGY CLINIC | Age: 78
End: 2023-11-30
Payer: MEDICARE

## 2023-11-30 VITALS
HEART RATE: 66 BPM | HEIGHT: 70 IN | BODY MASS INDEX: 25.91 KG/M2 | DIASTOLIC BLOOD PRESSURE: 68 MMHG | WEIGHT: 181 LBS | SYSTOLIC BLOOD PRESSURE: 126 MMHG

## 2023-11-30 DIAGNOSIS — R00.1 SYMPTOMATIC BRADYCARDIA: Primary | ICD-10-CM

## 2023-11-30 DIAGNOSIS — I25.10 CORONARY ARTERY DISEASE INVOLVING NATIVE CORONARY ARTERY OF NATIVE HEART WITHOUT ANGINA PECTORIS: ICD-10-CM

## 2023-11-30 DIAGNOSIS — R55 SYNCOPE, UNSPECIFIED SYNCOPE TYPE: ICD-10-CM

## 2023-11-30 DIAGNOSIS — I10 ESSENTIAL HYPERTENSION: ICD-10-CM

## 2023-11-30 DIAGNOSIS — Z95.0 PACEMAKER: ICD-10-CM

## 2023-11-30 DIAGNOSIS — R00.1 BRADYCARDIA: ICD-10-CM

## 2023-11-30 DIAGNOSIS — E78.2 MIXED HYPERLIPIDEMIA: ICD-10-CM

## 2023-11-30 PROCEDURE — 1036F TOBACCO NON-USER: CPT | Performed by: INTERNAL MEDICINE

## 2023-11-30 PROCEDURE — 93280 PM DEVICE PROGR EVAL DUAL: CPT | Performed by: INTERNAL MEDICINE

## 2023-11-30 PROCEDURE — G8417 CALC BMI ABV UP PARAM F/U: HCPCS | Performed by: INTERNAL MEDICINE

## 2023-11-30 PROCEDURE — 3074F SYST BP LT 130 MM HG: CPT | Performed by: INTERNAL MEDICINE

## 2023-11-30 PROCEDURE — G8427 DOCREV CUR MEDS BY ELIG CLIN: HCPCS | Performed by: INTERNAL MEDICINE

## 2023-11-30 PROCEDURE — G8484 FLU IMMUNIZE NO ADMIN: HCPCS | Performed by: INTERNAL MEDICINE

## 2023-11-30 PROCEDURE — 1123F ACP DISCUSS/DSCN MKR DOCD: CPT | Performed by: INTERNAL MEDICINE

## 2023-11-30 PROCEDURE — 3078F DIAST BP <80 MM HG: CPT | Performed by: INTERNAL MEDICINE

## 2023-11-30 PROCEDURE — 99213 OFFICE O/P EST LOW 20 MIN: CPT | Performed by: INTERNAL MEDICINE

## 2023-11-30 ASSESSMENT — ENCOUNTER SYMPTOMS
NAUSEA: 0
DIARRHEA: 0
GASTROINTESTINAL NEGATIVE: 1
VOMITING: 0
EYES NEGATIVE: 1
RESPIRATORY NEGATIVE: 1
SHORTNESS OF BREATH: 0

## 2023-11-30 NOTE — PROGRESS NOTES
Pacemaker interrogated  Presenting rhythm: ,   92%  Battey voltage 7.1 years  Lead status:  Lead impedance within range and stable  Sensing:   R waves 19.2 mV  Thresholds: Ventricular 0.750 V @ 0.4ms  Observations:  None  See scanned report for details  Reprogramming for sensitivity and threshold testing  Next [facility] appointment:  03/04/24

## 2024-03-05 ENCOUNTER — TELEPHONE (OUTPATIENT)
Dept: CARDIOLOGY CLINIC | Age: 79
End: 2024-03-05

## 2024-03-05 DIAGNOSIS — Z95.0 PACEMAKER: Primary | ICD-10-CM

## 2024-03-05 DIAGNOSIS — R00.1 BRADYCARDIA: ICD-10-CM

## 2024-05-28 ENCOUNTER — TELEPHONE (OUTPATIENT)
Dept: UROLOGY | Facility: CLINIC | Age: 79
End: 2024-05-28
Payer: MEDICARE

## 2024-05-28 NOTE — TELEPHONE ENCOUNTER
Attempted to call patient to set up his yearly appointment with Dr. Persaud via the recall in his chart.  Phone number went to busy signal, and voicemail did not .     Hub ok to schedule patient if they return to the call.  Please schedule from the recall in patient's chart with Dr. Persaud at either the [place] or [place] location, per patient preference.

## 2024-05-30 ENCOUNTER — OFFICE VISIT (OUTPATIENT)
Dept: CARDIOLOGY CLINIC | Age: 79
End: 2024-05-30

## 2024-05-30 VITALS
HEART RATE: 71 BPM | SYSTOLIC BLOOD PRESSURE: 124 MMHG | DIASTOLIC BLOOD PRESSURE: 74 MMHG | OXYGEN SATURATION: 100 % | HEIGHT: 69 IN | BODY MASS INDEX: 27.25 KG/M2 | WEIGHT: 184 LBS

## 2024-05-30 DIAGNOSIS — I10 ESSENTIAL HYPERTENSION: ICD-10-CM

## 2024-05-30 DIAGNOSIS — Z95.0 S/P PLACEMENT OF LEADLESS CARDIAC PACEMAKER: ICD-10-CM

## 2024-05-30 DIAGNOSIS — E78.2 MIXED HYPERLIPIDEMIA: ICD-10-CM

## 2024-05-30 DIAGNOSIS — I25.10 CORONARY ARTERY DISEASE INVOLVING NATIVE CORONARY ARTERY OF NATIVE HEART WITHOUT ANGINA PECTORIS: ICD-10-CM

## 2024-05-30 DIAGNOSIS — R00.1 SYMPTOMATIC BRADYCARDIA: Primary | ICD-10-CM

## 2024-05-30 ASSESSMENT — ENCOUNTER SYMPTOMS
EYES NEGATIVE: 1
DIARRHEA: 0
VOMITING: 0
NAUSEA: 0
GASTROINTESTINAL NEGATIVE: 1
SHORTNESS OF BREATH: 0
RESPIRATORY NEGATIVE: 1

## 2024-05-30 NOTE — PROGRESS NOTES
[facility]                            Date:  5/30/2024  Patient: Kavon Kraus  Age:  78 y.o., 1945      Reason for evaluation:         SUBJECTIVE:    Returns today for follow-up assessment followed for coronary artery disease hyperlipidemia bradycardia hypertension previous leadless pacemaker.  Overall feeling well.  Denies anginal chest pain dyspnea palpitations or other complaints.  Device interrogation today satisfactory.  Blood pressure 124/74 heart 71.  No other complaints or issues reported.    Review of Systems   Constitutional: Negative.  Negative for chills, fever and unexpected weight change.   HENT: Negative.     Eyes: Negative.    Respiratory: Negative.  Negative for shortness of breath.    Cardiovascular: Negative.  Negative for chest pain.   Gastrointestinal: Negative.  Negative for diarrhea, nausea and vomiting.   Endocrine: Negative.    Genitourinary: Negative.    Musculoskeletal: Negative.    Skin: Negative.    Neurological: Negative.          OBJECTIVE:    /74   Pulse 71   Ht 1.753 m (5' 9\")   Wt 83.5 kg (184 lb)   SpO2 100%   BMI 27.17 kg/m²     Labs:   CBC: No results for input(s): \"WBC\", \"HGB\", \"HCT\", \"PLT\" in the last 72 hours.  BMP:No results for input(s): \"NA\", \"K\", \"CO2\", \"BUN\", \"CREATININE\", \"LABGLOM\", \"GLUCOSE\" in the last 72 hours.  BNP: No results for input(s): \"BNP\" in the last 72 hours.  PT/INR: No results for input(s): \"PROTIME\", \"INR\" in the last 72 hours.  APTT:No results for input(s): \"APTT\" in the last 72 hours.  CARDIAC ENZYMES:No results for input(s): \"CKTOTAL\", \"CKMB\", \"CKMBINDEX\", \"TROPONINI\" in the last 72 hours.  FASTING LIPID PANEL:  Lab Results   Component Value Date/Time    HDL 47 02/22/2018 04:32 PM    TRIG 126 02/22/2018 04:32 PM     LIVER PROFILE:No results for input(s): \"AST\", \"ALT\", \"LABALBU\" in the last 72 hours.        Past Medical History:   Diagnosis Date    Brewer esophagus     Dr Vo    CAD (coronary artery

## 2024-05-30 NOTE — PROGRESS NOTES
Pacemaker interrogated  Presenting rhythm: AM ,   85.3%  Battey voltage 7.5 years  Lead status:  Lead impedance within range and stable  Sensing:   R waves >20 mV  Thresholds: Ventricular 0.75@ 0.24ms  Observations:  none  See scanned report for details  Reprogramming for sensitivity and threshold testing  Next [facility] appointment:  8/29/24

## 2024-06-21 DIAGNOSIS — R39.16 BENIGN PROSTATIC HYPERPLASIA (BPH) WITH STRAINING ON URINATION: ICD-10-CM

## 2024-06-21 DIAGNOSIS — N40.1 BENIGN PROSTATIC HYPERPLASIA (BPH) WITH STRAINING ON URINATION: ICD-10-CM

## 2024-06-21 RX ORDER — ALFUZOSIN HYDROCHLORIDE 10 MG/1
10 TABLET, EXTENDED RELEASE ORAL EVERY EVENING
Qty: 90 TABLET | Refills: 3 | Status: SHIPPED | OUTPATIENT
Start: 2024-06-21

## 2024-09-11 PROCEDURE — 93296 REM INTERROG EVL PM/IDS: CPT | Performed by: NURSE PRACTITIONER

## 2024-09-11 PROCEDURE — 93294 REM INTERROG EVL PM/LDLS PM: CPT | Performed by: NURSE PRACTITIONER

## 2024-09-12 DIAGNOSIS — Z95.0 PACEMAKER: Primary | ICD-10-CM

## 2024-09-12 DIAGNOSIS — R00.1 SYMPTOMATIC BRADYCARDIA: ICD-10-CM

## 2024-09-24 NOTE — PROGRESS NOTES
Subjective    Mr. Shahid is 78 y.o. male    Chief Complaint: BPH    History of Present Illness    78-year-old male established patient followup for for erectile dysfunction and enlarged prostate.   His LUTS are well controlled on alfuzosin and finasteride. Tadalafil is efficacious for his ED.   Associated symptoms he denies hematuria, dysuria, or flank pain.  UA today is clear     The following portions of the patient's history were reviewed and updated as appropriate: allergies, current medications, past family history, past medical history, past social history, past surgical history and problem list.    Review of Systems      Current Outpatient Medications:     alfuzosin (UROXATRAL) 10 MG 24 hr tablet, TAKE ONE TABLET BY MOUTH EVERY EVENING, Disp: 90 tablet, Rfl: 3    ASPIRIN 81 PO, Aspir-81  daily, Disp: , Rfl:     atorvastatin (LIPITOR) 40 MG tablet, , Disp: , Rfl:     calcium carbonate (OS-RAMON) 600 MG tablet, Take 1 tablet by mouth Daily., Disp: , Rfl:     famotidine (PEPCID) 40 MG tablet, Take 1 tablet by mouth Daily., Disp: , Rfl:     ferrous sulfate 325 (65 FE) MG tablet, Take 1 tablet by mouth Daily With Breakfast., Disp: , Rfl:     finasteride (PROSCAR) 5 MG tablet, Take 1 tablet by mouth Daily., Disp: 90 tablet, Rfl: 3    hydroCHLOROthiazide (MICROZIDE) 12.5 MG capsule, hydrochlorothiazide 12.5 mg capsule  TAKE 1 CAPSULE BY MOUTH  EVERY DAY, Disp: , Rfl:     lisinopril (PRINIVIL,ZESTRIL) 2.5 MG tablet, Take 1 tablet by mouth., Disp: , Rfl:     Metoprolol-HCTZ ER 25-12.5 MG tablet sustained-release 24 hour, metoprolol succ 25 mg-hydrochlorothiazide 12.5 mg tablet,ext.rel 24 hr  Take 1 tablet every day by oral route., Disp: , Rfl:     tadalafil (Cialis) 20 MG tablet, Take 1 tablet by mouth As Needed for Erectile Dysfunction., Disp: 30 tablet, Rfl: 11    Vitamin D, Cholecalciferol, (CHOLECALCIFEROL) 10 MCG (400 UNIT) tablet, Take 1 tablet by mouth Daily., Disp: , Rfl:     Past Medical History:   Diagnosis  Date    Hypertension        No past surgical history on file.    Social History     Socioeconomic History    Marital status:    Tobacco Use    Smoking status: Never    Smokeless tobacco: Never   Substance and Sexual Activity    Alcohol use: Yes     Comment: social    Drug use: Never       Family History   Problem Relation Age of Onset    No Known Problems Father     No Known Problems Mother        Objective    There were no vitals taken for this visit.    Physical Exam        Results for orders placed or performed in visit on 06/28/23   POC Urinalysis Dipstick, Multipro    Specimen: Urine   Result Value Ref Range    Color Yellow Yellow, Straw, Dark Yellow, [place]    Clarity, UA Clear Clear    Glucose, UA Negative Negative mg/dL    Bilirubin Negative Negative    Ketones, UA Negative Negative    Specific Gravity  1.010 1.005 - 1.030    Blood, UA Negative Negative    pH, Urine 5.5 5.0 - 8.0    Protein, POC Negative Negative mg/dL    Urobilinogen, UA Normal Normal, 0.2 E.U./dL    Nitrite, UA Negative Negative    Leukocytes Negative Negative     Assessment and Plan    Diagnoses and all orders for this visit:    1. Benign prostatic hyperplasia (BPH) with straining on urination (Primary)  -     POC Urinalysis Dipstick, Multipro  -     finasteride (PROSCAR) 5 MG tablet; Take 1 tablet by mouth Daily.  Dispense: 90 tablet; Refill: 3  -     alfuzosin (UROXATRAL) 10 MG 24 hr tablet; Take 1 tablet by mouth Every Evening.  Dispense: 90 tablet; Refill: 3    2. Erectile dysfunction due to diseases classified elsewhere  -     tadalafil (Cialis) 20 MG tablet; Take 1 tablet by mouth Daily As Needed for Erectile Dysfunction.  Dispense: 30 tablet; Refill: 11    LUTS well-controlled on combination therapy.  Continue alfuzosin and finasteride.    Continue tadalafil for his ED.  Follow-up in 1 year or sooner as needed.       This document has been signed by MARIBEL Persaud MD on October 7, 2024 18:51 CDT

## 2024-10-07 ENCOUNTER — OFFICE VISIT (OUTPATIENT)
Dept: UROLOGY | Facility: CLINIC | Age: 79
End: 2024-10-07
Payer: MEDICARE

## 2024-10-07 VITALS — HEIGHT: 70 IN | TEMPERATURE: 98.2 F | WEIGHT: 178 LBS | BODY MASS INDEX: 25.48 KG/M2

## 2024-10-07 DIAGNOSIS — R39.16 BENIGN PROSTATIC HYPERPLASIA (BPH) WITH STRAINING ON URINATION: Primary | ICD-10-CM

## 2024-10-07 DIAGNOSIS — N52.1 ERECTILE DYSFUNCTION DUE TO DISEASES CLASSIFIED ELSEWHERE: ICD-10-CM

## 2024-10-07 DIAGNOSIS — N40.1 BENIGN PROSTATIC HYPERPLASIA (BPH) WITH STRAINING ON URINATION: Primary | ICD-10-CM

## 2024-10-07 LAB
BILIRUB BLD-MCNC: NEGATIVE MG/DL
CLARITY, POC: CLEAR
COLOR UR: YELLOW
GLUCOSE UR STRIP-MCNC: NEGATIVE MG/DL
KETONES UR QL: NEGATIVE
LEUKOCYTE EST, POC: NEGATIVE
NITRITE UR-MCNC: NEGATIVE MG/ML
PH UR: 5.5 [PH] (ref 5–8)
PROT UR STRIP-MCNC: NEGATIVE MG/DL
RBC # UR STRIP: NEGATIVE /UL
SP GR UR: 1.02 (ref 1–1.03)
UROBILINOGEN UR QL: NORMAL

## 2024-10-07 PROCEDURE — 99214 OFFICE O/P EST MOD 30 MIN: CPT | Performed by: UROLOGY

## 2024-10-07 PROCEDURE — 1160F RVW MEDS BY RX/DR IN RCRD: CPT | Performed by: UROLOGY

## 2024-10-07 PROCEDURE — 81001 URINALYSIS AUTO W/SCOPE: CPT | Performed by: UROLOGY

## 2024-10-07 PROCEDURE — 1159F MED LIST DOCD IN RCRD: CPT | Performed by: UROLOGY

## 2024-10-07 RX ORDER — TADALAFIL 20 MG/1
20 TABLET ORAL DAILY PRN
Qty: 30 TABLET | Refills: 11 | Status: SHIPPED | OUTPATIENT
Start: 2024-10-07

## 2024-10-07 RX ORDER — UBIDECARENONE 100 MG
100 CAPSULE ORAL DAILY
COMMUNITY

## 2024-10-07 RX ORDER — ALFUZOSIN HYDROCHLORIDE 10 MG/1
10 TABLET, EXTENDED RELEASE ORAL EVERY EVENING
Qty: 90 TABLET | Refills: 3 | Status: SHIPPED | OUTPATIENT
Start: 2024-10-07

## 2024-10-07 RX ORDER — FINASTERIDE 5 MG/1
5 TABLET, FILM COATED ORAL DAILY
Qty: 90 TABLET | Refills: 3 | Status: SHIPPED | OUTPATIENT
Start: 2024-10-07

## 2024-10-08 ENCOUNTER — TELEPHONE (OUTPATIENT)
Dept: CARDIOLOGY CLINIC | Age: 79
End: 2024-10-08

## 2024-12-30 ENCOUNTER — TELEPHONE (OUTPATIENT)
Dept: CARDIOLOGY CLINIC | Age: 79
End: 2024-12-30

## 2024-12-31 DIAGNOSIS — Z95.0 PACEMAKER: Primary | ICD-10-CM

## 2024-12-31 DIAGNOSIS — R00.1 SYMPTOMATIC BRADYCARDIA: ICD-10-CM

## 2025-01-19 DIAGNOSIS — N40.1 BENIGN PROSTATIC HYPERPLASIA (BPH) WITH STRAINING ON URINATION: ICD-10-CM

## 2025-01-19 DIAGNOSIS — R39.16 BENIGN PROSTATIC HYPERPLASIA (BPH) WITH STRAINING ON URINATION: ICD-10-CM

## 2025-01-20 RX ORDER — FINASTERIDE 5 MG/1
5 TABLET, FILM COATED ORAL DAILY
Qty: 90 TABLET | Refills: 3 | Status: SHIPPED | OUTPATIENT
Start: 2025-01-20

## 2025-02-10 ENCOUNTER — TELEPHONE (OUTPATIENT)
Dept: UROLOGY | Facility: CLINIC | Age: 80
End: 2025-02-10
Payer: MEDICARE

## 2025-02-10 DIAGNOSIS — N52.1 ERECTILE DYSFUNCTION DUE TO DISEASES CLASSIFIED ELSEWHERE: ICD-10-CM

## 2025-02-10 RX ORDER — TADALAFIL 20 MG/1
20 TABLET ORAL DAILY PRN
Qty: 30 TABLET | Refills: 11 | Status: SHIPPED | OUTPATIENT
Start: 2025-02-10

## 2025-04-02 ENCOUNTER — TELEPHONE (OUTPATIENT)
Dept: CARDIOLOGY CLINIC | Age: 80
End: 2025-04-02

## 2025-05-06 ENCOUNTER — TELEPHONE (OUTPATIENT)
Dept: CARDIOLOGY CLINIC | Age: 80
End: 2025-05-06

## 2025-05-29 ENCOUNTER — OFFICE VISIT (OUTPATIENT)
Dept: CARDIOLOGY CLINIC | Age: 80
End: 2025-05-29

## 2025-05-29 VITALS
HEART RATE: 71 BPM | DIASTOLIC BLOOD PRESSURE: 70 MMHG | WEIGHT: 178 LBS | SYSTOLIC BLOOD PRESSURE: 128 MMHG | BODY MASS INDEX: 26.36 KG/M2 | HEIGHT: 69 IN

## 2025-05-29 DIAGNOSIS — Z95.0 PACEMAKER: Primary | ICD-10-CM

## 2025-05-29 DIAGNOSIS — I25.10 CORONARY ARTERY DISEASE INVOLVING NATIVE CORONARY ARTERY OF NATIVE HEART WITHOUT ANGINA PECTORIS: ICD-10-CM

## 2025-05-29 DIAGNOSIS — Z95.0 S/P PLACEMENT OF LEADLESS CARDIAC PACEMAKER: ICD-10-CM

## 2025-05-29 DIAGNOSIS — E78.2 MIXED HYPERLIPIDEMIA: ICD-10-CM

## 2025-05-29 DIAGNOSIS — Z95.5 H/O HEART ARTERY STENT: ICD-10-CM

## 2025-05-29 DIAGNOSIS — R00.1 SYMPTOMATIC BRADYCARDIA: ICD-10-CM

## 2025-05-29 DIAGNOSIS — I10 ESSENTIAL HYPERTENSION: ICD-10-CM

## 2025-05-29 ASSESSMENT — ENCOUNTER SYMPTOMS
RESPIRATORY NEGATIVE: 1
NAUSEA: 0
VOMITING: 0
SHORTNESS OF BREATH: 0
EYES NEGATIVE: 1
DIARRHEA: 0
GASTROINTESTINAL NEGATIVE: 1

## 2025-05-29 NOTE — PROGRESS NOTES
[facility]                            Date:  5/29/2025  Patient: Kavon Kraus  Age:  79 y.o., 1945      Reason for evaluation:          SUBJECTIVE:    Returns today follow-up assessment coronary artery disease previous stent leadless pacemaker bradycardia hypertension hyperlipidemia overall doing well.  Denies anginal chest pain or limiting dyspnea or other complaints cardiac related.  He does complain of fatigue intermittent back pain and neuropathy.  Regarding the fatigue it is possible the atorvastatin could be contributing to that I mentioned that it would be reasonable to either cut the dose in half and see if that makes an impact on his symptoms or alternatively to consider a drug holiday for 2 to 3 weeks and see what effect that would have and let us know.  He had questions regarding the lisinopril and hydrochlorothiazide whether or not this might be related to any of his symptoms and at this point it is really not clear I suggested he discuss this further with his primary care physician.  Device interrogated today as 6.5 years longevity functioning appropriately.    Review of Systems   Constitutional: Negative.  Negative for chills, fever and unexpected weight change.   HENT: Negative.     Eyes: Negative.    Respiratory: Negative.  Negative for shortness of breath.    Cardiovascular: Negative.  Negative for chest pain.   Gastrointestinal: Negative.  Negative for diarrhea, nausea and vomiting.   Endocrine: Negative.    Genitourinary: Negative.    Musculoskeletal: Negative.    Skin: Negative.    Neurological: Negative.    All other systems reviewed and are negative.        OBJECTIVE:    /70   Pulse 71   Ht 1.753 m (5' 9\")   Wt 80.7 kg (178 lb)   BMI 26.29 kg/m²     Labs:   CBC: No results for input(s): \"WBC\", \"HGB\", \"HCT\", \"PLT\" in the last 72 hours.  BMP:No results for input(s): \"NA\", \"K\", \"CO2\", \"BUN\", \"CREATININE\", \"LABGLOM\", \"GLUCOSE\" in the last 72

## 2025-05-29 NOTE — PROGRESS NOTES
Pacemaker interrogated  Presenting rhythm: AM ,   98.3%  Battey voltage 6.6 years  Lead status:  Lead impedance within range and stable  Sensing:   R waves paced at office check, auto 17.8 mV  Thresholds: Ventricular 0.63@ 0.24ms  Observations:  none  See scanned report for details  Reprogramming for sensitivity and threshold testing  [facility]

## 2025-09-03 ENCOUNTER — TELEPHONE (OUTPATIENT)
Dept: CARDIOLOGY CLINIC | Age: 80
End: 2025-09-03

## 2025-09-04 ENCOUNTER — TELEPHONE (OUTPATIENT)
Dept: CARDIOLOGY CLINIC | Age: 80
End: 2025-09-04

## 2025-09-05 DIAGNOSIS — R00.1 SYMPTOMATIC BRADYCARDIA: ICD-10-CM

## 2025-09-05 DIAGNOSIS — Z95.0 PACEMAKER: Primary | ICD-10-CM
